# Patient Record
Sex: MALE | Race: WHITE | NOT HISPANIC OR LATINO | Employment: OTHER | URBAN - METROPOLITAN AREA
[De-identification: names, ages, dates, MRNs, and addresses within clinical notes are randomized per-mention and may not be internally consistent; named-entity substitution may affect disease eponyms.]

---

## 2017-02-02 ENCOUNTER — TRANSFERRED RECORDS (OUTPATIENT)
Dept: HEALTH INFORMATION MANAGEMENT | Facility: CLINIC | Age: 72
End: 2017-02-02

## 2017-02-08 ENCOUNTER — TRANSFERRED RECORDS (OUTPATIENT)
Dept: HEALTH INFORMATION MANAGEMENT | Facility: CLINIC | Age: 72
End: 2017-02-08

## 2017-02-08 ENCOUNTER — MEDICAL CORRESPONDENCE (OUTPATIENT)
Dept: HEALTH INFORMATION MANAGEMENT | Facility: CLINIC | Age: 72
End: 2017-02-08

## 2017-02-14 ENCOUNTER — TELEPHONE (OUTPATIENT)
Dept: UROLOGY | Facility: CLINIC | Age: 72
End: 2017-02-14

## 2017-02-14 ENCOUNTER — PRE VISIT (OUTPATIENT)
Dept: UROLOGY | Facility: CLINIC | Age: 72
End: 2017-02-14

## 2017-02-14 NOTE — TELEPHONE ENCOUNTER
----- Message from Wendie Giron sent at 2/14/2017  1:49 PM CST -----  Regarding: RE: Sooner w/ Dr. Mathur for Elevated PSA?  Contact: [phone]  Thank you! Patient is scheduled for that date and time.    Wendie  ----- Message -----     From: Sanna Peters RN     Sent: 2/14/2017  12:44 PM       To: Jennifer Guevara LPN, Wendie Giron  Subject: RE: Sooner w/ Dr. Mathur for Elevated PSA?       3/21 at 8:30 am   Sanna  ----- Message -----     From: Jennifer Guevara LPN     Sent: 2/14/2017  12:38 PM       To: Sanna Peters RN  Subject: FW: Sooner w/ Dr. Matuhr for Elevated PSA?       Can you help with this?  ----- Message -----     From: Wendie Giron     Sent: 2/14/2017  12:30 PM       To: Urology & [facility] Triage-  Subject: Sooner w/ Dr. Mathur for Elevated PSA?           Hey All!    Patient is being referred to Dr. Mathur for Elevated PSA from Dr. Rosa Elena Villar at [facility] in [place]. Next opening I see isn't until 4/5 w/ Dr. Mathur, but they are wanting him to be seen as soon as possible. Would Dr. Mathur add this patient on for a sooner appointment?    Thanks!  Wendie  [facility] Referrals  [phone]

## 2017-03-15 ENCOUNTER — PRE VISIT (OUTPATIENT)
Dept: UROLOGY | Facility: CLINIC | Age: 72
End: 2017-03-15

## 2017-03-15 NOTE — TELEPHONE ENCOUNTER
Patient with prostate cancer coming in for a consult. Chart reviewed and all records have been received and scanned. No need for a call.

## 2017-03-21 ENCOUNTER — OFFICE VISIT (OUTPATIENT)
Dept: UROLOGY | Facility: CLINIC | Age: 72
End: 2017-03-21

## 2017-03-21 VITALS
BODY MASS INDEX: 22.44 KG/M2 | WEIGHT: 143 LBS | DIASTOLIC BLOOD PRESSURE: 100 MMHG | HEIGHT: 67 IN | SYSTOLIC BLOOD PRESSURE: 153 MMHG | HEART RATE: 58 BPM

## 2017-03-21 DIAGNOSIS — R35.0 URINARY FREQUENCY: ICD-10-CM

## 2017-03-21 DIAGNOSIS — R97.20 ELEVATED PROSTATE SPECIFIC ANTIGEN (PSA): Primary | ICD-10-CM

## 2017-03-21 LAB
ALBUMIN UR-MCNC: NEGATIVE MG/DL
APPEARANCE UR: CLEAR
BACTERIA #/AREA URNS HPF: ABNORMAL /HPF
BILIRUB UR QL STRIP: NEGATIVE
COLOR UR AUTO: YELLOW
GLUCOSE UR STRIP-MCNC: NEGATIVE MG/DL
HGB UR QL STRIP: ABNORMAL
HYALINE CASTS #/AREA URNS LPF: 1 /LPF (ref 0–2)
KETONES UR STRIP-MCNC: NEGATIVE MG/DL
LEUKOCYTE ESTERASE UR QL STRIP: NEGATIVE
MUCOUS THREADS #/AREA URNS LPF: PRESENT /LPF
NITRATE UR QL: NEGATIVE
PH UR STRIP: 5 PH (ref 5–7)
RBC #/AREA URNS AUTO: 22 /HPF (ref 0–2)
SP GR UR STRIP: 1.02 (ref 1–1.03)
SQUAMOUS #/AREA URNS AUTO: <1 /HPF (ref 0–1)
URN SPEC COLLECT METH UR: ABNORMAL
UROBILINOGEN UR STRIP-MCNC: 0 MG/DL (ref 0–2)
WBC #/AREA URNS AUTO: <1 /HPF (ref 0–2)

## 2017-03-21 PROCEDURE — 87086 URINE CULTURE/COLONY COUNT: CPT | Performed by: UROLOGY

## 2017-03-21 RX ORDER — BACITRACIN 500 UNIT/G
480 OINTMENT (GRAM) TOPICAL EVERY EVENING
Status: ON HOLD | COMMUNITY
End: 2017-05-20

## 2017-03-21 RX ORDER — CIPROFLOXACIN 500 MG/1
500 TABLET, FILM COATED ORAL 2 TIMES DAILY
Qty: 6 TABLET | Refills: 0 | Status: SHIPPED | OUTPATIENT
Start: 2017-03-21 | End: 2017-04-05

## 2017-03-21 RX ORDER — MULTIVITAMIN
1 TABLET ORAL EVERY EVENING
COMMUNITY

## 2017-03-21 ASSESSMENT — ENCOUNTER SYMPTOMS
FLANK PAIN: 0
SHORTNESS OF BREATH: 0
DIZZINESS: 0
TROUBLE SWALLOWING: 0
ABDOMINAL PAIN: 0
POSTURAL DYSPNEA: 0
DISTURBANCES IN COORDINATION: 0
VOMITING: 0
FEVER: 0
HOARSE VOICE: 0
DYSURIA: 0
LEG SWELLING: 0
RECTAL BLEEDING: 0
STIFFNESS: 0
WHEEZING: 0
HEMATURIA: 0
NECK PAIN: 0
EXERCISE INTOLERANCE: 0
SKIN CHANGES: 0
NAIL CHANGES: 0
WEIGHT LOSS: 0
BLOOD IN STOOL: 0
NECK MASS: 0
SMELL DISTURBANCE: 0
LEG PAIN: 0
ORTHOPNEA: 0
RECTAL PAIN: 0
CHILLS: 0
COUGH DISTURBING SLEEP: 0
LOSS OF CONSCIOUSNESS: 0
COUGH: 0
POOR WOUND HEALING: 0
NIGHT SWEATS: 0
EYE IRRITATION: 0
SPEECH CHANGE: 0
INSOMNIA: 0
NAUSEA: 0
BACK PAIN: 0
WEIGHT GAIN: 0
SLEEP DISTURBANCES DUE TO BREATHING: 0
WEAKNESS: 0
BOWEL INCONTINENCE: 0
TACHYCARDIA: 0
HYPOTENSION: 0
SINUS PAIN: 0
SINUS CONGESTION: 0
TREMORS: 0
MUSCLE WEAKNESS: 0
JOINT SWELLING: 0
TASTE DISTURBANCE: 0
SPUTUM PRODUCTION: 0
CLAUDICATION: 0
POLYDIPSIA: 0
DIARRHEA: 0
MUSCLE CRAMPS: 0
POLYPHAGIA: 0
HEMOPTYSIS: 0
PARALYSIS: 0
EYE WATERING: 0
EXTREMITY NUMBNESS: 0
HEADACHES: 0
NUMBNESS: 0
HALLUCINATIONS: 0
DYSPNEA ON EXERTION: 0
SNORES LOUDLY: 0
LIGHT-HEADEDNESS: 0
SWOLLEN GLANDS: 0
JAUNDICE: 0
TINGLING: 0
FATIGUE: 0
EYE REDNESS: 0
SEIZURES: 0
HEARTBURN: 0
MYALGIAS: 0
ARTHRALGIAS: 0
ALTERED TEMPERATURE REGULATION: 0
INCREASED ENERGY: 0
SORE THROAT: 0
DIFFICULTY URINATING: 0
DEPRESSION: 0
DECREASED APPETITE: 0
RESPIRATORY PAIN: 0
HYPERTENSION: 0
BLOATING: 0
CONSTIPATION: 0
SYNCOPE: 0
DOUBLE VISION: 0
DECREASED CONCENTRATION: 0
EYE PAIN: 0
MEMORY LOSS: 0
BRUISES/BLEEDS EASILY: 0
NERVOUS/ANXIOUS: 0
PALPITATIONS: 0
PANIC: 0

## 2017-03-21 ASSESSMENT — PAIN SCALES - GENERAL: PAINLEVEL: NO PAIN (0)

## 2017-03-21 NOTE — MR AVS SNAPSHOT
After Visit Summary   3/21/2017    Eduardo Whitley    MRN: [id number]           Patient Information     Date Of Birth          1945        Visit Information        Provider Department      3/21/2017 8:30 AM Everton Mathur MD [facility] Urology and [facility] [[email or web address]] for Prostate and Urologic Cancers        Today's Diagnoses     Elevated prostate specific antigen (PSA)    -  1    Urinary frequency          Care Instructions      [place] for Prostate and Urologic Cancers  MRI Fusion Prostate Bx Patient Instructions  What is MRI Fusion Prostate Bx?  The MRI fusion biopsy is used to target a specific area for sampling. It requires a needle guide to be inserted into the rectum next to the prostate. Next, MR images are viewed, the abnormal area is identified, and a needle is inserted through the guide to the targeted area for tissue samples.  How do I prepare for the exam?    Take Cipro 500mg one tablet in the morning and one in the evening starting the day prior to procedure x 3 days    You will be receiving a Gentamicin intramuscular injection in the Urology clinic 30 min prior to your procedure. Please plan to arrive 30 min earlier.      Do Fleets Enema 2 hrs prior to procedure    Stop NSAIDS/Aspirin 7 days prior to procedure. If you are taking any other anticoagulation medications such as Coumadin, Heparin, or Lovenox, please consult with your physician prior to scheduling the procedure    How long will procedure take?  MRI fusion biopsy will take about 1 hr. Please allow 2 hrs for the whole procedure (including pre and post)  When will I know my results?  We will schedule a 2 week follow up appointment for you to review your pathology results with your physician.   *Please arrive 30 min prior to your scheduled procedure time*  Who do I contact if I have questions?  Please call Urology and [facility] at [phone] Opt # 3 to speak to a nurse.         Follow-ups after your visit        Your next 10 appointments  already scheduled     Mar 21, 2017 10:15 AM CDT   LAB with  LAB   [facility] ([facility])    [address]  [address] [facility] [id number] [phone]           Patient must bring picture ID.  Patient should be prepared to give a urine specimen  Please do not eat 10-12 hours before your appointment if you are coming in fasting for labs on lipids, cholesterol, or glucose (sugar).  Pregnant women should follow their Care Team instructions. Water with medications is okay. Do not drink coffee or other fluids.   If you have concerns about taking  your medications, please ask at office or if scheduling via [employer], send a message by clicking on Secure Messaging, Message Your Care Team.            Mar 22, 2017  9:30 AM CDT   (Arrive by 9:15 AM)   Sonography/Biopsy with Everton Mathur MD   [facility] Urology and [facility] for Prostate and Urologic Cancers ([facility])    [address]  [address] [place] [facility] [id number] [phone]            Apr 05, 2017 11:00 AM CDT   (Arrive by 10:45 AM)   Return Visit with Everton Mathur MD   [facility] Urology and [facility] for Prostate and Urologic Cancers ([facility])    [address] [place] [address]  [address] [facility] [address] [phone]              Future tests that were ordered for you today     Open Future Orders        Priority Expected Expires Ordered    Urine Culture Aerobic Bacterial Routine 3/21/2017 3/21/2018 3/21/2017    Routine UA with microscopic - No culture Routine  3/21/2018 3/21/2017            Who to contact     Please call your clinic at [phone] to:    Ask questions about your health    Make or cancel appointments    Discuss your medicines    Learn about your test results    Speak to your doctor   If you have compliments or concerns about an experience at your clinic, or if you wish to file a complaint, please contact [facility]  "Patient Relations at [phone] or email us at [email or web address]         Additional Information About Your Visit        S[employer] Information     [email or web address] gives you secure access to your electronic health record. If you see a primary care provider, you can also send messages to your care team and make appointments. If you have questions, please call your primary care clinic.  If you do not have a primary care provider, please call [phone] and they will assist you.      [email or web address] is an electronic gateway that provides easy, online access to your medical records. With [email or web address], you can request a clinic appointment, read your test results, renew a prescription or communicate with your care team.     To access your existing account, please contact your [facility] or call [phone] for assistance.        Care EveryWhere ID     This is your Care EveryWhere ID. This could be used by other organizations to access your [facility] medical records  [id number]        Your Vitals Were     Pulse Height BMI (Body Mass Index)             58 1.689 m (5' 6.5\") 22.74 kg/m2          Blood Pressure from Last 3 Encounters:   03/21/17 (!) 153/100    Weight from Last 3 Encounters:   03/21/17 64.9 kg (143 lb)                 Today's Medication Changes          These changes are accurate as of: 3/21/17  9:42 AM.  If you have any questions, ask your nurse or doctor.               Start taking these medicines.        Dose/Directions    ciprofloxacin 500 MG tablet   Commonly known as:  CIPRO   Used for:  Elevated prostate specific antigen (PSA)   Started by:  Everton Mathur MD        Dose:  500 mg   Take 1 tablet (500 mg) by mouth 2 times daily   Quantity:  6 tablet   Refills:  0            Where to get your medicines      These medications were sent to [facility]/pharmacy #[id number] [place], [place] - [address] [place] [id number]     Phone:  [phone]     " ciprofloxacin 500 MG tablet                Primary Care Provider    None Specified       No primary provider on file.        Thank you!     Thank you for choosing [facility] UROLOGY AND [facility] FOR PROSTATE AND UROLOGIC CANCERS  for your care. Our goal is always to provide you with excellent care. Hearing back from our patients is one way we can continue to improve our services. Please take a few minutes to complete the written survey that you may receive in the mail after your visit with us. Thank you!             Your Updated Medication List - Protect others around you: Learn how to safely use, store and throw away your medicines at www.disposemymeds.org.          This list is accurate as of: 3/21/17  9:42 AM.  Always use your most recent med list.                   Brand Name Dispense Instructions for use    ciprofloxacin 500 MG tablet    CIPRO    6 tablet    Take 1 tablet (500 mg) by mouth 2 times daily       Multiple vitamin Tabs      Take 1 tablet by mouth       Saw Palmetto 160 MG Caps      Take 480 mg by mouth

## 2017-03-21 NOTE — LETTER
3/21/2017       RE: Eduardo Whitley  [address] [facility] [address]     Dear Colleague,    Thank you for referring your patient, Eduardo Whitley, to the [facility] UROLOGY AND INST FOR PROSTATE AND UROLOGIC CANCERS at [facility]. Please see a copy of my visit note below.    We are pleased to see Mr. Eduardo Whitley in consultation at the request of Zeenat Villar for the evaluation of chief complaint listed below    Chief Complaint:    Elevated PSA         History of Present Illness:   Eduardo Whitley is a(n) 71 year old male w/ an unremarkable past medical history who presents with a gradually increasing PSA. He was noted to have a PSA of 4.3 at an annual physical 10 years ago. He started taking saw palmetto and eating lots of tomatoes at that time. PSA was down to 2.8 a year later. 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 Cystoscopy at that time revealed normal bladder mucosa with moderate prostate obstruction. Subsequent MRI on 2/2/16 demonstrated a focal curvilinear region of mild-moderate T2 hypointensity in the left peripheral zone of his prostate, of non-specific etiology. PI- RADS score was 3. Mild cystic change was also noted throughout the transitional one and there was no evidence of lymphadenopathy. He was referred to us for further evaluation and possible fusion biopsy. He denies any unexplained weight loss, night sweats, back pain, pelvic pain, urinary symptoms, or sexual/erectile dysfunction. He has no further physical concerns at this time.           Past Medical History:     Past Medical History   Diagnosis Date     Rotator cuff tear             Past  Surgical History:     Past Surgical History   Procedure Laterality Date     Tonsillectomy       Adenoidectomy       Right shoulder arthroscopy       Left shoulder arthroscopy              Social History:   Used to serve in the .       Smoking: Never  Alcohol: Yes  IV Drug Use: None         Family History:   No family history on file.  No urologic cancers in the family.         Allergies:   No Known Allergies         Medications:     Current Outpatient Prescriptions   Medication Sig     Multiple vitamin TABS Take 1 tablet by mouth     Saw Palmetto 160 MG CAPS Take 480 mg by mouth     No current facility-administered medications for this visit.             REVIEW OF SYSTEMS:   Review of Systems     Constitutional:  Negative for fever, chills, weight loss, weight gain, fatigue, decreased appetite, night sweats, recent stressors, height gain, height loss, post-operative complications, incisional pain, hallucinations, increased energy, hyperactivity and confused.   HENT:  Negative for ear pain, hearing loss, tinnitus, nosebleeds, trouble swallowing, hoarse voice, mouth sores, sore throat, ear discharge, tooth pain, gum tenderness, taste disturbance, smell disturbance, hearing aid, bleeding gums, dry mouth, sinus pain, sinus congestion and neck mass.    Eyes:  Negative for double vision, pain, redness, eye pain, decreased vision, eye watering, eye bulging, eye dryness, flashing lights, spots, floaters, strabismus, tunnel vision, jaundice and eye irritation.   Respiratory:   Negative for cough, hemoptysis, sputum production, shortness of breath, wheezing, sleep disturbances due to breathing, snores loudly, respiratory pain, dyspnea on exertion, cough disturbing sleep and postural dyspnea.    Cardiovascular:  Negative for chest pain, dyspnea on exertion, palpitations, orthopnea, claudication, leg swelling, fingers/toes turn blue, hypertension, hypotension, syncope, history of heart murmur, chest pain on exertion,  "chest pain at rest, pacemaker, few scattered varicosities, leg pain, sleep disturbances due to breathing, tachycardia, light-headedness, exercise intolerance and edema.   Gastrointestinal:  Negative for heartburn, nausea, vomiting, abdominal pain, diarrhea, constipation, blood in stool, melena, rectal pain, bloating, hemorrhoids, bowel incontinence, jaundice, rectal bleeding, coffee ground emesis and change in stool.   Genitourinary:  Negative for bladder incontinence, dysuria, urgency, hematuria, flank pain, difficulty urinating, nocturia, voiding less frequently, scrotal pain, ulcerations, penile discharge, male genitourinary complaint and reduced libido.   Musculoskeletal:  Negative for myalgias, back pain, joint swelling, arthralgias, stiffness, muscle cramps, neck pain, bone pain, muscle weakness and fracture.   Skin:  Negative for nail changes, itching, poor wound healing, rash, hair changes, skin changes, acne, warts, poor wound healing, scarring, flaky skin, Raynaud's phenomenon, sensitivity to sunlight and skin thickening.   Neurological:  Negative for dizziness, tingling, tremors, speech change, seizures, loss of consciousness, weakness, light-headedness, numbness, headaches, disturbances in coordination, extremity numbness, memory loss, difficulty walking and paralysis.   Endo/Heme:  Negative for anemia, swollen glands and bruises/bleeds easily.   Psychiatric/Behavioral:  Negative for depression, hallucinations, memory loss, decreased concentration, mood swings and panic attacks.    Endocrine:  Negative for altered temperature regulation, polyphagia, polydipsia, unwanted hair growth and change in facial hair.             PHYSICAL EXAM   BP (!) 153/100  Pulse 58  Ht 1.689 m (5' 6.5\")  Wt 64.9 kg (143 lb)  BMI 22.74 kg/m2  GENERAL: No acute distress. Well nourished.   HEENT:  Sclerae anicteric.  Conjunctivae pink.  Moist mucous membranes.  NECK:  Supple.  No lymphadenopathy.  CARDIAC:  Regular rate and " rhythm.  LUNGS:  Non-labored breathing  BACK:  No costovertebral tenderness.  ABDOMEN: Soft, non-tender, no surgical scars, no organomegaly, non-tender.  :  Phallus circumcised, meatus adequate, no plaques palpated. Testes descended bilaterally, no intratesticular masses.  Epididymes non-tender.  No varicoceles or inguinal hernias.  RECTAL:  Good tone.  Prostate smooth, symmetric, non-tender, no nodules. Small size.  SKIN: No rashes.  Dry.     EXTREMITIES:  Warm, well perfused.  No lower extremity edema bilaterally.  NEURO: normal gait, no focal deficits.           LABS AND IMAGING:   No new imaging to review. Outside imaging reviewed and was remarkable for the following:    CT Urogram 10/18/16:  Bilateral cysts, no solid renal masses, large lipomatous mass in L pelvis    MRI Prostate 2/217:  Focal curvilinear region of mild-moderate T2 hypointensity in L peripheral zone, non-specific etiology. PI-RADS: 3. Mild cystic change throughout transitional zone of prostate. No evidence of lymphadenopathy.    PSA values as per HPI          ASSESSMENT:   Eduardo Whitley is a healthy 71 year old male who presents for elevated PSA. JOSH today is unremarkable and not concerning. Benefit of fusion biopsy for further evaluation of prostate lesion noted on outside MRI was discussed. He voiced understanding of the risks and benefits of this procedure and wishes to proceed.   Unfortunately, we will not be able to perform this until tomorrow.           PLAN:     Urinalysis    MRI fusion biopsy tomorrow morning    I, Javier Villa, MS3, am acting as scribe for Dr. Everton Mathur MD.    Patient was seen and examined with Dr. Mathur      CC: Garrels  Patient seen and examined by me.  I agree with the medical student's documentation of the encounter.   I obtained the history and examined the patient and student only acted as a scribe.  Total visit time 30 minutes with >50% spent in counseling  Everton Mathur MD

## 2017-03-21 NOTE — NURSING NOTE
"Chief Complaint   Patient presents with     Consult     Elevated PSA       Blood pressure (!) 153/100, pulse 58, height 1.689 m (5' 6.5\"), weight 64.9 kg (143 lb). Body mass index is 22.74 kg/(m^2).    There is no problem list on file for this patient.      No Known Allergies    Current Outpatient Prescriptions   Medication Sig Dispense Refill     Multiple vitamin TABS Take 1 tablet by mouth       Saw Palmetto 160 MG CAPS Take 480 mg by mouth         Social History   Substance Use Topics     Smoking status: Never Smoker     Smokeless tobacco: Never Used     Alcohol use Not on file       TIFFANY Perez  3/21/2017  8:35 AM       "

## 2017-03-21 NOTE — PATIENT INSTRUCTIONS
[place] for Prostate and Urologic Cancers  MRI Fusion Prostate Bx Patient Instructions  What is MRI Fusion Prostate Bx?  The MRI fusion biopsy is used to target a specific area for sampling. It requires a needle guide to be inserted into the rectum next to the prostate. Next, MR images are viewed, the abnormal area is identified, and a needle is inserted through the guide to the targeted area for tissue samples.  How do I prepare for the exam?    Take Cipro 500mg one tablet in the morning and one in the evening starting the day prior to procedure x 3 days    You will be receiving a Gentamicin intramuscular injection in the Urology clinic 30 min prior to your procedure. Please plan to arrive 30 min earlier.      Do Fleets Enema 2 hrs prior to procedure    Stop NSAIDS/Aspirin 7 days prior to procedure. If you are taking any other anticoagulation medications such as Coumadin, Heparin, or Lovenox, please consult with your physician prior to scheduling the procedure    How long will procedure take?  MRI fusion biopsy will take about 1 hr. Please allow 2 hrs for the whole procedure (including pre and post)  When will I know my results?  We will schedule a 2 week follow up appointment for you to review your pathology results with your physician.   *Please arrive 30 min prior to your scheduled procedure time*  Who do I contact if I have questions?  Please call Urology and [facility] at [phone] Opt # [phone] to speak to a nurse.

## 2017-03-21 NOTE — PROGRESS NOTES
We are pleased to see Mr. Eduardo Whitley in consultation at the request of Zeenat Villar for the evaluation of chief complaint listed below    Chief Complaint:    Elevated PSA         History of Present Illness:   Eduardo Whitley is a(n) 71 year old male w/ an unremarkable past medical history who presents with a gradually increasing PSA. He was noted to have a PSA of 4.3 at an annual physical 10 years ago. He started taking saw palmetto and eating lots of tomatoes at that time. PSA was down to 2.8 a year later. 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 Cystoscopy at that time revealed normal bladder mucosa with moderate prostate obstruction. Subsequent MRI on 2/2/16 demonstrated a focal curvilinear region of mild-moderate T2 hypointensity in the left peripheral zone of his prostate, of non-specific etiology. PI- RADS score was 3. Mild cystic change was also noted throughout the transitional one and there was no evidence of lymphadenopathy. He was referred to us for further evaluation and possible fusion biopsy. He denies any unexplained weight loss, night sweats, back pain, pelvic pain, urinary symptoms, or sexual/erectile dysfunction. He has no further physical concerns at this time.           Past Medical History:     Past Medical History   Diagnosis Date     Rotator cuff tear             Past Surgical History:     Past Surgical History   Procedure Laterality Date     Tonsillectomy       Adenoidectomy       Right shoulder arthroscopy       Left shoulder arthroscopy              Social History:   Used to serve in the .       Smoking: Never  Alcohol: Yes  IV Drug Use: None         Family  History:   No family history on file.  No urologic cancers in the family.         Allergies:   No Known Allergies         Medications:     Current Outpatient Prescriptions   Medication Sig     Multiple vitamin TABS Take 1 tablet by mouth     Saw Palmetto 160 MG CAPS Take 480 mg by mouth     No current facility-administered medications for this visit.             REVIEW OF SYSTEMS:   Review of Systems     Constitutional:  Negative for fever, chills, weight loss, weight gain, fatigue, decreased appetite, night sweats, recent stressors, height gain, height loss, post-operative complications, incisional pain, hallucinations, increased energy, hyperactivity and confused.   HENT:  Negative for ear pain, hearing loss, tinnitus, nosebleeds, trouble swallowing, hoarse voice, mouth sores, sore throat, ear discharge, tooth pain, gum tenderness, taste disturbance, smell disturbance, hearing aid, bleeding gums, dry mouth, sinus pain, sinus congestion and neck mass.    Eyes:  Negative for double vision, pain, redness, eye pain, decreased vision, eye watering, eye bulging, eye dryness, flashing lights, spots, floaters, strabismus, tunnel vision, jaundice and eye irritation.   Respiratory:   Negative for cough, hemoptysis, sputum production, shortness of breath, wheezing, sleep disturbances due to breathing, snores loudly, respiratory pain, dyspnea on exertion, cough disturbing sleep and postural dyspnea.    Cardiovascular:  Negative for chest pain, dyspnea on exertion, palpitations, orthopnea, claudication, leg swelling, fingers/toes turn blue, hypertension, hypotension, syncope, history of heart murmur, chest pain on exertion, chest pain at rest, pacemaker, few scattered varicosities, leg pain, sleep disturbances due to breathing, tachycardia, light-headedness, exercise intolerance and edema.   Gastrointestinal:  Negative for heartburn, nausea, vomiting, abdominal pain, diarrhea, constipation, blood in stool, melena, rectal pain,  "bloating, hemorrhoids, bowel incontinence, jaundice, rectal bleeding, coffee ground emesis and change in stool.   Genitourinary:  Negative for bladder incontinence, dysuria, urgency, hematuria, flank pain, difficulty urinating, nocturia, voiding less frequently, scrotal pain, ulcerations, penile discharge, male genitourinary complaint and reduced libido.   Musculoskeletal:  Negative for myalgias, back pain, joint swelling, arthralgias, stiffness, muscle cramps, neck pain, bone pain, muscle weakness and fracture.   Skin:  Negative for nail changes, itching, poor wound healing, rash, hair changes, skin changes, acne, warts, poor wound healing, scarring, flaky skin, Raynaud's phenomenon, sensitivity to sunlight and skin thickening.   Neurological:  Negative for dizziness, tingling, tremors, speech change, seizures, loss of consciousness, weakness, light-headedness, numbness, headaches, disturbances in coordination, extremity numbness, memory loss, difficulty walking and paralysis.   Endo/Heme:  Negative for anemia, swollen glands and bruises/bleeds easily.   Psychiatric/Behavioral:  Negative for depression, hallucinations, memory loss, decreased concentration, mood swings and panic attacks.    Endocrine:  Negative for altered temperature regulation, polyphagia, polydipsia, unwanted hair growth and change in facial hair.             PHYSICAL EXAM   BP (!) 153/100  Pulse 58  Ht 1.689 m (5' 6.5\")  Wt 64.9 kg (143 lb)  BMI 22.74 kg/m2  GENERAL: No acute distress. Well nourished.   HEENT:  Sclerae anicteric.  Conjunctivae pink.  Moist mucous membranes.  NECK:  Supple.  No lymphadenopathy.  CARDIAC:  Regular rate and rhythm.  LUNGS:  Non-labored breathing  BACK:  No costovertebral tenderness.  ABDOMEN: Soft, non-tender, no surgical scars, no organomegaly, non-tender.  :  Phallus circumcised, meatus adequate, no plaques palpated. Testes descended bilaterally, no intratesticular masses.  Epididymes non-tender.  No " varicoceles or inguinal hernias.  RECTAL:  Good tone.  Prostate smooth, symmetric, non-tender, no nodules. Small size.  SKIN: No rashes.  Dry.     EXTREMITIES:  Warm, well perfused.  No lower extremity edema bilaterally.  NEURO: normal gait, no focal deficits.           LABS AND IMAGING:   No new imaging to review. Outside imaging reviewed and was remarkable for the following:    CT Urogram 10/18/16:  Bilateral cysts, no solid renal masses, large lipomatous mass in L pelvis    MRI Prostate 2/217:  Focal curvilinear region of mild-moderate T2 hypointensity in L peripheral zone, non-specific etiology. PI-RADS: 3. Mild cystic change throughout transitional zone of prostate. No evidence of lymphadenopathy.    PSA values as per HPI          ASSESSMENT:   Eduardo Whitley is a healthy 71 year old male who presents for elevated PSA. JOSH today is unremarkable and not concerning. Benefit of fusion biopsy for further evaluation of prostate lesion noted on outside MRI was discussed. He voiced understanding of the risks and benefits of this procedure and wishes to proceed.   Unfortunately, we will not be able to perform this until tomorrow.           PLAN:     Urinalysis    MRI fusion biopsy tomorrow morning    I, Javier Villa, MS3, am acting as scribe for Dr. Everton Mathur MD.    Patient was seen and examined with Dr. Mathur      CC: [employer]  Patient seen and examined by me.  I agree with the medical student's documentation of the encounter.   I obtained the history and examined the patient and student only acted as a scribe.  Total visit time 30 minutes with >50% spent in counseling  Everton Mathur MD

## 2017-03-22 ENCOUNTER — OFFICE VISIT (OUTPATIENT)
Dept: UROLOGY | Facility: CLINIC | Age: 72
End: 2017-03-22

## 2017-03-22 ENCOUNTER — HOSPITAL ENCOUNTER (INPATIENT)
Dept: GENERAL RADIOLOGY | Facility: CLINIC | Age: 72
End: 2017-03-22
Attending: UROLOGY

## 2017-03-22 VITALS
BODY MASS INDEX: 22.98 KG/M2 | WEIGHT: 143 LBS | HEART RATE: 60 BPM | HEIGHT: 66 IN | SYSTOLIC BLOOD PRESSURE: 174 MMHG | DIASTOLIC BLOOD PRESSURE: 97 MMHG

## 2017-03-22 DIAGNOSIS — R97.20 ELEVATED PROSTATE SPECIFIC ANTIGEN (PSA): Primary | ICD-10-CM

## 2017-03-22 LAB
BACTERIA SPEC CULT: NO GROWTH
Lab: NORMAL
MICRO REPORT STATUS: NORMAL
SPECIMEN SOURCE: NORMAL

## 2017-03-22 PROCEDURE — 88305 TISSUE EXAM BY PATHOLOGIST: CPT | Performed by: UROLOGY

## 2017-03-22 ASSESSMENT — PAIN SCALES - GENERAL
PAINLEVEL: NO PAIN (0)
PAINLEVEL: NO PAIN (0)

## 2017-03-22 NOTE — NURSING NOTE
"Chief Complaint   Patient presents with     Prostate Biopsy     Elevated PSA, abnormal MRI     Verified with patient that he has not taken blood thinners or aspirin products in seven days, started Cipro 500 mg BID x 3 days yesterday 3/21/17, and did a fleets enema earlier today.    Blood pressure (!) 183/117, pulse 60, height 1.689 m (5' 6.5\"), weight 64.9 kg (143 lb). Body mass index is 22.74 kg/(m^2).    There is no problem list on file for this patient.      No Known Allergies    Current Outpatient Prescriptions   Medication Sig Dispense Refill     Multiple vitamin TABS Take 1 tablet by mouth       Saw Palmetto 160 MG CAPS Take 480 mg by mouth       ciprofloxacin (CIPRO) 500 MG tablet Take 1 tablet (500 mg) by mouth 2 times daily 6 tablet 0       Social History   Substance Use Topics     Smoking status: Never Smoker     Smokeless tobacco: Never Used     Alcohol use Not on file       TIFFANY Perez  3/22/2017  10:15 AM       "

## 2017-03-22 NOTE — NURSING NOTE
The following medication was given:     MEDICATION: Gentamicin  ROUTE: IM  SITE: [facility]  DOSE: 80 mg   LOT #: [id number]  :  Xuanyixia  EXPIRATION DATE:  04/18  NDC#: 64787-997-36

## 2017-03-22 NOTE — NURSING NOTE
Lidocaine 1%  Drug Amount given = 10 mL  Drug Amount wasted = 10 mL  Laina Galloway CMA  March 22, 2017

## 2017-03-22 NOTE — LETTER
3/22/2017       RE: Eduardo Whitley  [address] [facility] [address]     Dear Colleague,    Thank you for referring your patient, Eduardo Whitley, to the [facility] UROLOGY AND INST FOR PROSTATE AND UROLOGIC CANCERS at [facility]. Please see a copy of my visit note below.    PREPROCEDURE DIAGNOSIS: elevated PSA.   POSTPROCEDURE DIAGNOSIS: same.   PROCEDURE: MRI fusion guided transrectal ultrasound guided prostatic needle biopsy   SURGEON: Kevan  ANESTHESIA: 5 mL of 1% periprostatic block bilaterally   DESCRIPTION OF PROCEDURE: The procedure, the outcome, the anesthesia, and the risks were discussed with the patient.  Informed consent was obtained and signed and a timeout was completed prior to the procedure. Patient was placed in a left lateral decubitus position on the procedure table. Onetargets identified on MRI left mid PZ.   A 7.5mHz ultrasound probe was inserted into the rectum.  TRUS demonstrated a 22.4cc prostate. 1% lidocaine anesthesia was injected into the periprostatic tissue on either side at base and apex for a total of 10cc.  Sweep performed and ultrasound images aligned with MR images.  Biopsies were taken from the one targets (3 cores) and from the left and right base, mid and apex medial and lateral for a total of 15cores.  Patient tolerated the procedure well.  There were no complications.    PLAN: We will call him with the results as soon as they are made available. Patient will  follow-up in 2 weeks.       Again, thank you for allowing me to participate in the care of your patient.      Sincerely,    Everton Mathur MD

## 2017-03-22 NOTE — PROGRESS NOTES
PREPROCEDURE DIAGNOSIS: elevated PSA.   POSTPROCEDURE DIAGNOSIS: same.   PROCEDURE: MRI fusion guided transrectal ultrasound guided prostatic needle biopsy   SURGEON: Kevan  ANESTHESIA: 5 mL of 1% periprostatic block bilaterally   DESCRIPTION OF PROCEDURE: The procedure, the outcome, the anesthesia, and the risks were discussed with the patient.  Informed consent was obtained and signed and a timeout was completed prior to the procedure. Patient was placed in a left lateral decubitus position on the procedure table. Onetargets identified on MRI left mid PZ.   A 7.5mHz ultrasound probe was inserted into the rectum.  TRUS demonstrated a 22.4cc prostate. 1% lidocaine anesthesia was injected into the periprostatic tissue on either side at base and apex for a total of 10cc.  Sweep performed and ultrasound images aligned with MR images.  Biopsies were taken from the one targets (3 cores) and from the left and right base, mid and apex medial and lateral for a total of 15cores.  Patient tolerated the procedure well.  There were no complications.    PLAN: We will call him with the results as soon as they are made available. Patient will  follow-up in 2 weeks.

## 2017-03-22 NOTE — MR AVS SNAPSHOT
After Visit Summary   3/22/2017    Eduardo Whitley    MRN: [id number]           Patient Information     Date Of Birth          1945        Visit Information        Provider Department      3/22/2017 9:30 AM Everton Mathur MD [facility] Urology and [facility] for Prostate and Urologic Cancers        Today's Diagnoses     Elevated prostate specific antigen (PSA)    -  1      Care Instructions        [place] for Prostate and Urologic Cancers  Precautions Following a Prostate Biopsy    There are four conditions that you should watch for after a prostate biopsy:    1. Excessive pain  2. Bleeding irregularities (passing numerous  dime sized  clots or if your urine looks like cranberry juice)  3. Fever of 100 degrees or more  4. If you are unable to urinate        If any of these occur, call the Urology Clinic during normal business hours (M-F, 8:00-4:30) at [phone].  If you experience a problem after normal business hours, call our 24-hour phone number at [phone] and ask for the Urology Resident on call to be paged.      If you experience any discomfort following the biopsy, you may take Tylenol.  DO NOT TAKE ASPIRIN unless specified by your physician.   If the discomfort becomes severe or uncontrolled by medication, contact the Urology Clinic or Urology Resident (after normal business hours).      Do not be alarmed if you have some blood in your stool, in your urine, or ejaculate (semen).  This occurrence is normal and may last up to three (3) or four (4) days, usually intermittently.  Blood in the ejaculate (semen) may last several weeks, up to about a dozen ejaculations.  The blood in your ejaculate may appear as brown streaks, blood tinged, and immediately following a biopsy, it may appear bright red.      If you run a fever above 100 degrees, call the Urology Clinic or Urology Resident (after normal business hours) immediately.  If you are unable to reach your physician or the Resident on  call, go to the nearest emergency room.  Explain that you have had a transrectal biopsy of your prostate and what problems you are experiencing.        You should attempt to urinate following your biopsy before you leave the clinic.  If you are unable to urinate four (4) to six (6) hours after you leave the clinic, you will need to contact the Urology Clinic or the Resident on call.  If you are unable to reach your physician or the Resident on call, go to the nearest emergency room.            If you have any questions or concerns after your biopsy, feel free to contact the Urology Clinic at [phone] during M-F, 8:00-5pm business hours.  If you need to speak with someone after normal business hours, call [phone] and ask for the Resident on call to be paged.        Follow-ups after your visit        Follow-up notes from your care team     Return in about 2 weeks (around 4/5/2017).      Your next 10 appointments already scheduled     Apr 05, 2017 11:00 AM CDT   (Arrive by 10:45 AM)   Return Visit with Everton Mathur MD   [facility] Urology and [facility] for Prostate and Urologic Cancers ([facility])    [address] [place] [id number] [phone]              Future tests that were ordered for you today     Open Future Orders        Priority Expected Expires Ordered    MR Outside Read Routine  3/21/2018 3/21/2017            Who to contact     Please call your clinic at [phone] to:    Ask questions about your health    Make or cancel appointments    Discuss your medicines    Learn about your test results    Speak to your doctor   If you have compliments or concerns about an experience at your clinic, or if you wish to file a complaint, please contact [facility] Patient Relations at [phone] or email us at [email or web address]         Additional Information About Your Visit        MyChart Information     MyChart  "gives you secure access to your electronic health record. If you see a primary care provider, you can also send messages to your care team and make appointments. If you have questions, please call your primary care clinic.  If you do not have a primary care provider, please call [phone] and they will assist you.      NextNine is an electronic gateway that provides easy, online access to your medical records. With NextNine, you can request a clinic appointment, read your test results, renew a prescription or communicate with your care team.     To access your existing account, please contact your [facility] or call [phone] for assistance.        Care EveryWhere ID     This is your Care EveryWhere ID. This could be used by other organizations to access your [facility] medical records  [id number]        Your Vitals Were     Pulse Height BMI (Body Mass Index)             60 1.689 m (5' 6.5\") 22.74 kg/m2          Blood Pressure from Last 3 Encounters:   03/22/17 (!) 174/97   03/21/17 (!) 153/100    Weight from Last 3 Encounters:   03/22/17 64.9 kg (143 lb)   03/21/17 64.9 kg (143 lb)              We Performed the Following     BIOPSY PROSTATE NEEDLE/PUNCH     MRI Fusion (In Clinic)     US GUIDE FOR NEEDLE PLACEMENT     US TRANSRECTAL        Primary Care Provider    None Specified       No primary provider on file.        Thank you!     Thank you for choosing [facility] UROLOGY AND [facility] FOR PROSTATE AND UROLOGIC CANCERS  for your care. Our goal is always to provide you with excellent care. Hearing back from our patients is one way we can continue to improve our services. Please take a few minutes to complete the written survey that you may receive in the mail after your visit with us. Thank you!             Your Updated Medication List - Protect others around you: Learn how to safely use, store and throw away your medicines at www.disposemymeds.org.          This list is accurate as " of: 3/22/17 11:19 AM.  Always use your most recent med list.                   Brand Name Dispense Instructions for use    ciprofloxacin 500 MG tablet    CIPRO    6 tablet    Take 1 tablet (500 mg) by mouth 2 times daily       Multiple vitamin Tabs      Take 1 tablet by mouth       Saw Palmetto 160 MG Caps      Take 480 mg by mouth

## 2017-03-22 NOTE — PATIENT INSTRUCTIONS
[place] for Prostate and Urologic Cancers  Precautions Following a Prostate Biopsy    There are four conditions that you should watch for after a prostate biopsy:    1. Excessive pain  2. Bleeding irregularities (passing numerous  dime sized  clots or if your urine looks like cranberry juice)  3. Fever of 100 degrees or more  4. If you are unable to urinate        If any of these occur, call the Urology Clinic during normal business hours (M-F, 8:00-4:30) at [phone].  If you experience a problem after normal business hours, call our 24-hour phone number at [phone] and ask for the Urology Resident on call to be paged.      If you experience any discomfort following the biopsy, you may take Tylenol.  DO NOT TAKE ASPIRIN unless specified by your physician.   If the discomfort becomes severe or uncontrolled by medication, contact the Urology Clinic or Urology Resident (after normal business hours).      Do not be alarmed if you have some blood in your stool, in your urine, or ejaculate (semen).  This occurrence is normal and may last up to three (3) or four (4) days, usually intermittently.  Blood in the ejaculate (semen) may last several weeks, up to about a dozen ejaculations.  The blood in your ejaculate may appear as brown streaks, blood tinged, and immediately following a biopsy, it may appear bright red.      If you run a fever above 100 degrees, call the Urology Clinic or Urology Resident (after normal business hours) immediately.  If you are unable to reach your physician or the Resident on call, go to the nearest emergency room.  Explain that you have had a transrectal biopsy of your prostate and what problems you are experiencing.        You should attempt to urinate following your biopsy before you leave the clinic.  If you are unable to urinate four (4) to six (6) hours after you leave the clinic, you will need to contact the Urology Clinic or the Resident on call.  If you are unable to reach  your physician or the Resident on call, go to the nearest emergency room.            If you have any questions or concerns after your biopsy, feel free to contact the Urology Clinic at [phone] during M-F, 8:00-5pm business hours.  If you need to speak with someone after normal business hours, call [phone] and ask for the Resident on call to be paged.

## 2017-03-22 NOTE — NURSING NOTE
Invasive Procedure Safety Checklist:    Procedure:     Action: Complete sections and checkboxes as appropriate.    Pre-procedure:  1. Patient ID Verified with 2 identifiers (Zulema and  or MRN) : YES    2. Procedure and site verified with patient/designee (when able) : YES    3. Accurate consent documentation in medical record : YES    4. H&P (or appropriate assessment) documented in medical record : YES  H&P must be up to 30 days prior to procedure an updated within 24 hours of                 Procedure as applicable.     5. Relevant diagnostic and radiology test results appropriately labeled and displayed as applicable : YES    6. Blood products, implants, devices, and/or special equipment available for the procedure as applicable : YES    7. Procedure site(s) marked with provider initials [Exclusions:   None] : NO    8. Marking not required. Reason : Yes  Procedure does not require site marking    Time Out:     Time-Out performed immediately prior to starting procedure, including verbal and active participation of all team members addressing: YES    1. Correct patient identity.  2. Confirmed that the correct side and site are marked.  3. An accurate procedure to be done.  4. Agreement on the procedure to be done.  5. Correct patient position.  6. Relevant images and results are properly labeled and appropriately displayed.  7. The need to administer antibiotics or fluids for irrigation purposes during the procedure as applicable.  8. Safety precautions based on patient history or medication use.    During Procedure: Verification of correct person, site, and procedure occurs any time the responsibility for care of the patient is transferred to another member of the care team.

## 2017-03-22 NOTE — LETTER
April 4, 2017       TO: Eduardo Whitley  [address] [facility] [address]       DearMr.Angi,    We are writing to inform you of your test results.    Test results indicate you may require additional follow up, see comment below.    Resulted Orders   Surgical pathology exam   Result Value Ref Range    Copath Report       Patient Name: EDUARDO WHITLEY  MR#: [id number]  Specimen #: [id number]  Collected: 3/22/2017  Received: 3/22/2017  Reported: 3/23/2017 19:37  Ordering Phy(s): SUZI KIM    For improved result formatting, select 'View Enhanced Report Format'  under Linked Documents section.    SPECIMEN(S):  A: Prostate biopsy, left mid PZ  B: Prostate biopsy, left base  C: Prostate biopsy, left mid  D: Prostate biopsy, left apex  E: Prostate biopsy, right base  F: Prostate biopsy, right mid  G: Prostate biopsy, right apex    FINAL DIAGNOSIS:  A. Prostate, left mid PZ, biopsy:  - Prostatic adenocarcinoma, acinar type  - Grade group 4  - [place] score 8 (4+4)  - Extent: Involves one core (0.5 mm; <5%)    B. Prostate, left base, biopsy:  - Prostatic adenocarcinoma, acinar type  - Grade group 3  - [place] score 7 (4+3)  - Extent: Involves two cores (10 mm; 90%; 10 mm, 85%)    C. Prostate, left mid, biopsy:  - Prostatic adenocarcinoma, acinar type  - Grade group 3  - [place] score 7 (4+3)  - Extent:  Involves two cores (9.5 mm; 90%; 8 mm, 70%)    D. Prostate, left apex, biopsy:  - Prostatic adenocarcinoma, acinar type  - Grade group 3  - Nabeel score 7 (4+3)  - Extent: Involves two cores (10 mm; 90%; 5 mm, 50%)    E. Prostate, right base, biopsy:  - High-grade prostatic intraepithelial neoplasm (HGPIN)    F. Prostate, right mid, biopsy:  - Benign prostatic tissue    G. Prostate, right apex, biopsy:  - High-grade prostatic intraepithelial neoplasm (HGPIN)    I have personally reviewed all specimens and or slides, including the  listed special stains, and used them with my medical judgement to  determine the  "final diagnosis.    Electronically signed out by:    Praveen Mcknight M.D., [facility]    CLINICAL HISTORY:  71 year-old male with a PSA of 8.48 on 3/15/17.  A MRI revealed a  curvilinear marginated focal region at the mid to posterior left lateral  aspect of the mid to inferior peripheral zone measuring 1.5 x 1.0 cm.    GROSS:  A: The specimen is received in formalin with prope r patient  identification, labeled \"prostate, L. mid PZ\", and consists of three  slightly stringy pale tan soft tissue cores, ranging in length from 0.9  cm to 1.4 cm, and each 0.1 cm in diameter. Entirely submitted in  cassette A1.    B: The specimen is received in formalin with proper patient  identification, labeled \"prostate, LB\", and consists of two pale tan  soft tissue cores, each 1.3 cm in length x 0.1 cm in diameter. Entirely  submitted in cassette B1.    C: The specimen is received in formalin with proper patient  identification, labeled \"prostate, LM\", and consists of two slightly  stringy pale tan soft tissue cores, 1.3 cm and 1.4 cm in length, and  each 0.1 cm in diameter. Entirely submitted in cassette C1.    D: The specimen is received in formalin with proper patient  identification, labeled \"prostate, LA\", and consists of two slightly  stringy pale tan soft tissue cores, 1.0 cm and 1.2 cm in length, and  each 0.1 cm in diameter. Entirely submitted in cassette D1.    E:  The specimen is received in formalin with proper patient  identification, labeled \"prostate, RB\", and consists of two stringy and  friable pale tan soft tissue cores, 0.8 cm and 1.1 cm in length, and  each 0.1 cm in diameter. Entirely submitted in cassette E1.    F: The specimen is received in formalin with proper patient  identification, labeled \"prostate, RM\", and consists of two slightly  stringy pale tan soft tissue cores, 1.5 cm and 1.6 cm in length, and  each 0.1 cm in diameter. Entirely submitted in cassette F1.    G: The specimen is received in " "formalin with proper patient  identification, labeled \"prostate, RA\", and consists of two slightly  stringy pale tan soft tissue cores, 1.1 cm and 1.5 cm in length, and  each 0.1 cm in diameter. Entirely submitted in cassette G1. (Dictated  by: Alycia Prince 3/22/2017 01:32 PM)    MICROSCOPIC:  Microscopic examination was performed.    CPT Codes:  A: 95284.051  B: 49234.051  C: 56150.051  D: 07661.051  E: 58399.051  F: 67562.051   .051    TESTING LAB LOCATION:  [facility], [address]   [address] [phone]    COLLECTION SITE:  Client: [facility]  Location: [facility] (B)         Thank you for choosing [facility] for your care. Please follow up as previously planned.  Please call with any questions or concerns.    Thank you,  Leydi Zimmerman, RN Care Coordinator for  Dr. Everton Mathur    "

## 2017-03-23 LAB — COPATH REPORT: NORMAL

## 2017-03-31 ENCOUNTER — PRE VISIT (OUTPATIENT)
Dept: UROLOGY | Facility: CLINIC | Age: 72
End: 2017-03-31

## 2017-03-31 NOTE — TELEPHONE ENCOUNTER
Patient with a positive biopsy coming in for results and to discuss a treatment plan. Chart reviewed and all records available. No need for a call.

## 2017-04-05 ENCOUNTER — ALLIED HEALTH/NURSE VISIT (OUTPATIENT)
Dept: UROLOGY | Facility: CLINIC | Age: 72
End: 2017-04-05

## 2017-04-05 ENCOUNTER — OFFICE VISIT (OUTPATIENT)
Dept: UROLOGY | Facility: CLINIC | Age: 72
End: 2017-04-05

## 2017-04-05 VITALS
HEIGHT: 67 IN | SYSTOLIC BLOOD PRESSURE: 151 MMHG | BODY MASS INDEX: 23.98 KG/M2 | DIASTOLIC BLOOD PRESSURE: 90 MMHG | HEART RATE: 57 BPM | WEIGHT: 152.8 LBS

## 2017-04-05 DIAGNOSIS — C61 MALIGNANT NEOPLASM OF PROSTATE (H): Primary | ICD-10-CM

## 2017-04-05 RX ORDER — HEPARIN SODIUM 5000 [USP'U]/.5ML
5000 INJECTION, SOLUTION INTRAVENOUS; SUBCUTANEOUS ONCE
Status: CANCELLED | OUTPATIENT
Start: 2017-04-05 | End: 2017-04-05

## 2017-04-05 RX ORDER — CEFAZOLIN SODIUM 1 G/3ML
1 INJECTION, POWDER, FOR SOLUTION INTRAMUSCULAR; INTRAVENOUS SEE ADMIN INSTRUCTIONS
Status: CANCELLED | OUTPATIENT
Start: 2017-04-05

## 2017-04-05 ASSESSMENT — PAIN SCALES - GENERAL: PAINLEVEL: NO PAIN (0)

## 2017-04-05 NOTE — PROGRESS NOTES
"Urology Clinic Note      Date: 4/5/2017  Time: 11:06 AM  Patient: Eduardo Whitley  MRN: [id number]    HPI/Subjective: Eduardo Whitley is a 72 year old male with an unremarkable past medical history who presented to clinic on 3/21/17 with gradually increasing PSA:    PSA (9/20/16): 7.07  PSA (1/27/17): 8.44  PSA (3/15/17): 8.48    CT Urogram on 10/18/16 did not show evidence of prostate cancer. Cystoscopy at that time was nromal. MRI on 2/2/16 revealed PI-RADS of 3 with a hypointensity of the left peripheral zone of his prostate. Given his MRI and PSA findings, he agreed to MRI fusion biopsy, which was performed on 3/22/17. This showed Nabeel 4+4=8 in his left mid PZ, as well as 3 areas of 4+3=7 of his left base, left mid, and left apex. Further results below.    He experienced some blood in his semen following the biopsy, but this has cleared. He denies hematuria, hematochezia, fevers, chills, or further symptoms. He has no further physical concerns at this time.     Objective:  /90  Pulse 57  Ht 1.689 m (5' 6.5\")  Wt 69.3 kg (152 lb 12.8 oz)  BMI 24.29 kg/m2  GENERAL: No acute distress. Well nourished.   HEENT:  Sclerae anicteric.  Conjunctivae pink.  Moist mucous membranes.  NECK:  No lymphadenopathy.  CARDIAC:  No JVP  LUNGS:  Non-labored breathing  BACK:  No costovertebral tenderness.  SKIN: No rashes.  Dry.     NEURO: normal gait, no focal deficits.     Laboratory Results:  Prostate Biopsy 3/22/17:  FINAL DIAGNOSIS:   A. Prostate, left mid PZ, biopsy:   - Prostatic adenocarcinoma, acinar type   - Grade group 4   - [place] score 8 (4+4)   - Extent: Involves one core (0.5 mm; <5%)     B. Prostate, left base, biopsy:   - Prostatic adenocarcinoma, acinar type   - Grade group 3   - Nabeel score 7 (4+3)   - Extent: Involves two cores (10 mm; 90%; 10 mm, 85%)     C. Prostate, left mid, biopsy:   - Prostatic adenocarcinoma, acinar type   - Grade group 3   - Nabeel score 7 (4+3)   - Extent: Involves two " cores (9.5 mm; 90%; 8 mm, 70%)     D. Prostate, left apex, biopsy:   - Prostatic adenocarcinoma, acinar type   - Grade group 3   - Nabeel score 7 (4+3)   - Extent: Involves two cores (10 mm; 90%; 5 mm, 50%)     E. Prostate, right base, biopsy:   - High-grade prostatic intraepithelial neoplasm (HGPIN)     F. Prostate, right mid, biopsy:   - Benign prostatic tissue     G. Prostate, right apex, biopsy:   - High-grade prostatic intraepithelial neoplasm (HGPIN)       Assessment & Plan: Eduardo Whitley is a 72 year old male with a recent diagnosis of prostate cancer with 1 core of GL 4+4 in <5% of Left mid peripheral zone as well as 3 areas of GL 4+3. Main treatment options discussed today included robotic radical prostatectomy, radiation therapy and androgen deprivation therapy. Risks and benefits of each of these treatment options were discussed in depth, and he voiced understanding. He wishes to proceed with radical prostatectomy.     - Schedule for robotic radical prostatectomy    - Anesthesia consult    - Bone scan     I, Javier Villa, MS3, am acting as scribe for Dr. Everton Mathur MD.     Patient seen and examined by me.  I agree with the medical student's documentation of the encounter.   I obtained the history and examined the patient and student only acted as a scribe.  Total visit time 15 minutes with >50% spent in counseling  Everton Mathur MD

## 2017-04-05 NOTE — MR AVS SNAPSHOT
After Visit Summary   4/5/2017    Eduardo Whitley    MRN: [id number]           Patient Information     Date Of Birth          1945        Visit Information        Provider Department      4/5/2017 11:45 AM Nurse, [facility] and [facility]        Today's Diagnoses     Malignant neoplasm of prostate (H)    -  1       Follow-ups after your visit        Your next 10 appointments already scheduled     Apr 06, 2017 10:00 AM CDT   NM INJECTION with UUNMINJ2   [facility], Nuclear Medicine ([facility])    [address] [place] [id number] [phone]            Apr 06, 2017  1:00 PM CDT   NM BONE SCAN WHOLE BODY with UUNM2   [facility], Nuclear Medicine ([facility])    [address] [place] [id number] [phone]           Please bring a list of your medicines to the exam. (Include vitamins, minerals and over-the-counter drugs.) You should wear comfortable clothes. Leave your valuables at home. Please bring related prior results and films. Tell your doctor:   If you are breastfeeding or may be pregnant.   If you have had a barium test within the past few days. Barium may change the results of certain exams.   If you think you may need sedation (medicine to help you relax).  You may eat and drink as normal.  Drink plenty of fluids and empty bladder frequently between injection and scans.            May 31, 2017  3:30 PM CDT   (Arrive by 3:15 PM)   Post-Op with Aidan Biggs MD   [facility]rology and [facility] ([facility])    [address] [id number] [phone]              Future tests that were ordered for you today     Open Future Orders        Priority Expected Expires Ordered    X-Ray Cystogram Routine 5/31/2017 4/5/2018  4/5/2017    NM Bone Scan Whole Body Routine  4/5/2018 4/5/2017            Who to contact     Please call your clinic at [phone] to:    Ask questions about your health    Make or cancel appointments    Discuss your medicines    Learn about your test results    Speak to your doctor   If you have compliments or concerns about an experience at your clinic, or if you wish to file a complaint, please contact [facility] Patient Relations at [phone] or email us at [email or web address]         Additional Information About Your Visit        [employer] Information     [employer] gives you secure access to your electronic health record. If you see a primary care provider, you can also send messages to your care team and make appointments. If you have questions, please call your primary care clinic.  If you do not have a primary care provider, please call [phone] and they will assist you.      [employer] is an electronic gateway that provides easy, online access to your medical records. With [employer], you can request a clinic appointment, read your test results, renew a prescription or communicate with your care team.     To access your existing account, please contact your [facility] or call [phone] for assistance.        Care EveryWhere ID     This is your Care EveryWhere ID. This could be used by other organizations to access your [place] medical records  [id number]         Blood Pressure from Last 3 Encounters:   04/05/17 151/90   03/22/17 (!) 174/97   03/21/17 (!) 153/100    Weight from Last 3 Encounters:   04/05/17 69.3 kg (152 lb 12.8 oz)   03/22/17 64.9 kg (143 lb)   03/21/17 64.9 kg (143 lb)              Today, you had the following     No orders found for display         Today's Medication Changes          These changes are accurate as of: 4/5/17  1:54 PM.  If you have any questions, ask your nurse or doctor.               Stop taking  these medicines if you haven't already. Please contact your care team if you have questions.     ciprofloxacin 500 MG tablet   Commonly known as:  CIPRO   Stopped by:  Everton Mathur MD                    Primary Care Provider    Provider Unknown       No address on file        Thank you!     Thank you for choosing [facility] UROLOGY AND [facility] FOR PROSTATE AND UROLOGIC CANCERS  for your care. Our goal is always to provide you with excellent care. Hearing back from our patients is one way we can continue to improve our services. Please take a few minutes to complete the written survey that you may receive in the mail after your visit with us. Thank you!             Your Updated Medication List - Protect others around you: Learn how to safely use, store and throw away your medicines at www.disposemymeds.org.          This list is accurate as of: 4/5/17  1:54 PM.  Always use your most recent med list.                   Brand Name Dispense Instructions for use    Multiple vitamin Tabs      Take 1 tablet by mouth       Saw Palmetto 160 MG Caps      Take 480 mg by mouth

## 2017-04-05 NOTE — NURSING NOTE
"Chief Complaint   Patient presents with     RECHECK     Prostate biopsy results and discuss treatment plan       Blood pressure 151/90, pulse 57, height 1.689 m (5' 6.5\"), weight 69.3 kg (152 lb 12.8 oz). Body mass index is 24.29 kg/(m^2).    Patient Active Problem List   Diagnosis     Elevated prostate specific antigen (PSA)       No Known Allergies    Current Outpatient Prescriptions   Medication Sig Dispense Refill     Multiple vitamin TABS Take 1 tablet by mouth       Saw Palmetto 160 MG CAPS Take 480 mg by mouth         Social History   Substance Use Topics     Smoking status: Never Smoker     Smokeless tobacco: Never Used     Alcohol use Not on file       TIFFANY Perez  4/5/2017  10:46 AM       "

## 2017-04-05 NOTE — MR AVS SNAPSHOT
After Visit Summary   4/5/2017    Eduardo Whitley    MRN: [id number]           Patient Information     Date Of Birth          1945        Visit Information        Provider Department      4/5/2017 11:00 AM Everton Mathur MD [facility]rology and [facility]        Today's Diagnoses     Malignant neoplasm of prostate (H)    -  1       Follow-ups after your visit        Additional Services     PAC Visit Referral (For [facility] Only)       Does this visit require an Anesthesia consult?  Yes - Evaluate for medical necessity related to one of the following conditions:      H&P done by:  N/A and Other (Specify): PAC      Please be aware that coverage of these services is subject to the terms and limitations of your health insurance plan.  Call member services at your health plan with any benefit or coverage questions.      Please bring the following to your appointment:  >>   Any x-rays, CTs or MRIs which have been performed.  Contact the facility where they were done to arrange for  prior to your scheduled appointment.  Any new CT, MRI or other procedures ordered by your specialist must be performed at a [place] facility or coordinated by your clinic's referral office.    >>   List of current medications  >>   This referral request   >>   Any documents/labs given to you for this referral                  Follow-up notes from your care team     Return in about 3 weeks (around 4/26/2017).      Your next 10 appointments already scheduled     Apr 06, 2017 10:00 AM CDT   NM INJECTION with UUNMINJ2   [facility] [place], Nuclear Medicine ([facility])    [address] [facility] [address] [phone]            Apr 06, 2017  1:00 PM CDT   NM BONE SCAN WHOLE BODY with UUNM2   [facility], Nuclear Medicine ([facility])    [address] [place] [phone]    [phone]           Please bring a list of your medicines to the exam. (Include vitamins, minerals and over-the-counter drugs.) You should wear comfortable clothes. Leave your valuables at home. Please bring related prior results and films. Tell your doctor:   If you are breastfeeding or may be pregnant.   If you have had a barium test within the past few days. Barium may change the results of certain exams.   If you think you may need sedation (medicine to help you relax).  You may eat and drink as normal.  Drink plenty of fluids and empty bladder frequently between injection and scans.              Future tests that were ordered for you today     Open Future Orders        Priority Expected Expires Ordered    NM Bone Scan Whole Body Routine  4/5/2018 4/5/2017            Who to contact     Please call your clinic at [phone] to:    Ask questions about your health    Make or cancel appointments    Discuss your medicines    Learn about your test results    Speak to your doctor   If you have compliments or concerns about an experience at your clinic, or if you wish to file a complaint, please contact [facility] Patient Relations at [phone] or email us at [email or web address]         Additional Information About Your Visit        [employer] Information     [employer] gives you secure access to your electronic health record. If you see a primary care provider, you can also send messages to your care team and make appointments. If you have questions, please call your primary care clinic.  If you do not have a primary care provider, please call [phone] and they will assist you.      [employer] is an electronic gateway that provides easy, online access to your medical records. With [employer], you can request a clinic appointment, read your test results, renew a prescription or communicate with your care team.     To access your existing account, please contact your [facility]  "[facility] or call [phone] for assistance.        Care EveryWhere ID     This is your Care EveryWhere ID. This could be used by other organizations to access your [place] medical records  [id number]        Your Vitals Were     Pulse Height BMI (Body Mass Index)             57 1.689 m (5' 6.5\") 24.29 kg/m2          Blood Pressure from Last 3 Encounters:   04/05/17 151/90   03/22/17 (!) 174/97   03/21/17 (!) 153/100    Weight from Last 3 Encounters:   04/05/17 69.3 kg (152 lb 12.8 oz)   03/22/17 64.9 kg (143 lb)   03/21/17 64.9 kg (143 lb)              We Performed the Following     PAC Visit Referral (For [facility] Only)     Lucia-Operative Worksheet          Today's Medication Changes          These changes are accurate as of: 4/5/17 12:02 PM.  If you have any questions, ask your nurse or doctor.               Stop taking these medicines if you haven't already. Please contact your care team if you have questions.     ciprofloxacin 500 MG tablet   Commonly known as:  CIPRO   Stopped by:  Everton Mathur MD                    Primary Care Provider    None Specified       No primary provider on file.        Thank you!     Thank you for choosing [facility] UROLOGY AND [facility] FOR PROSTATE AND UROLOGIC CANCERS  for your care. Our goal is always to provide you with excellent care. Hearing back from our patients is one way we can continue to improve our services. Please take a few minutes to complete the written survey that you may receive in the mail after your visit with us. Thank you!             Your Updated Medication List - Protect others around you: Learn how to safely use, store and throw away your medicines at www.disposemymeds.org.          This list is accurate as of: 4/5/17 12:02 PM.  Always use your most recent med list.                   Brand Name Dispense Instructions for use    Multiple vitamin Tabs      Take 1 tablet by mouth       Saw Palmetto 160 MG Caps      Take 480 mg by mouth    "

## 2017-04-05 NOTE — NURSING NOTE
Pre Op Teaching Flowsheet       Pre and Post op Patient Education  Relevant Diagnosis:  Prostate cancer  Surgical procedure:  Robotic radical prostatectomy bilateral pelvic lymphadenectomy  Teaching Topic:  Pre and post op teaching  Person Involved in teaching: Eduardo Whitley    Motivation Level:  Asks Questions: Yes  Eager to Learn:  Yes  Cooperative: Yes  Receptive (willing/able to accept information):  Yes    Patient demonstrates understanding of the following:  Date of surgery:  5/19/17  Location of surgery:  [address] [place] [address]  History and Physical and any other testing necessary prior to surgery: Yes  Required time line for completion of History and Physical and any pre-op testing: Yes    Patient demonstrates understanding of the following:  Pre-op bowel prep: None needed  Pre-op showering/scrub information with PCMX Soap: Yes  Blood thinner medications discussed and when to stop (if applicable):  Yes      Infection Prevention:   Patient demonstrates understanding of the following:  Surgical procedure site care taught: at time of discharge  Signs and symptoms of infection taught:  Yes      Post-op follow-up:  Discussed how to contact the hospital, nurse, and clinic scheduling staff if necessary.    Instructional materials used/given/mailed:  [facility] Booklet, post op teaching sheet, Map, Soap, and arrival/location information.    Surgical instructions packet given to patient in office:  Yes.

## 2017-04-06 ENCOUNTER — HOSPITAL ENCOUNTER (OUTPATIENT)
Dept: NUCLEAR MEDICINE | Facility: CLINIC | Age: 72
Setting detail: NUCLEAR MEDICINE
Discharge: HOME | End: 2017-04-06
Attending: UROLOGY | Admitting: UROLOGY
Payer: MEDICARE

## 2017-04-06 ENCOUNTER — HOSPITAL ENCOUNTER (OUTPATIENT)
Dept: NUCLEAR MEDICINE | Facility: CLINIC | Age: 72
Setting detail: NUCLEAR MEDICINE
End: 2017-04-06
Attending: UROLOGY
Payer: MEDICARE

## 2017-04-06 DIAGNOSIS — C61 MALIGNANT NEOPLASM OF PROSTATE (H): ICD-10-CM

## 2017-04-06 PROCEDURE — A9503 TC99M MEDRONATE: HCPCS | Performed by: UROLOGY

## 2017-04-06 PROCEDURE — 78306 BONE IMAGING WHOLE BODY: CPT

## 2017-04-06 PROCEDURE — 34300033 ZZH RX 343: Performed by: UROLOGY

## 2017-04-06 RX ORDER — TC 99M MEDRONATE 20 MG/10ML
20-30 INJECTION, POWDER, LYOPHILIZED, FOR SOLUTION INTRAVENOUS ONCE
Status: COMPLETED | OUTPATIENT
Start: 2017-04-06 | End: 2017-04-06

## 2017-04-06 RX ADMIN — TC 99M MEDRONATE 24.16 MCI.: 20 INJECTION, POWDER, LYOPHILIZED, FOR SOLUTION INTRAVENOUS at 12:33

## 2017-04-11 DIAGNOSIS — C61 MALIGNANT NEOPLASM OF PROSTATE (H): Primary | ICD-10-CM

## 2017-05-05 ENCOUNTER — OFFICE VISIT (OUTPATIENT)
Dept: SURGERY | Facility: CLINIC | Age: 72
End: 2017-05-05

## 2017-05-05 ENCOUNTER — ANESTHESIA EVENT (OUTPATIENT)
Dept: SURGERY | Facility: CLINIC | Age: 72
DRG: 708 | End: 2017-05-05
Payer: MEDICARE

## 2017-05-05 ENCOUNTER — ALLIED HEALTH/NURSE VISIT (OUTPATIENT)
Dept: SURGERY | Facility: CLINIC | Age: 72
End: 2017-05-05

## 2017-05-05 DIAGNOSIS — C61 PROSTATE CANCER (H): ICD-10-CM

## 2017-05-05 DIAGNOSIS — Z01.818 PRE-OP EXAMINATION: Primary | ICD-10-CM

## 2017-05-05 LAB
ALBUMIN UR-MCNC: NEGATIVE MG/DL
APPEARANCE UR: CLEAR
BACTERIA #/AREA URNS HPF: ABNORMAL /HPF
BILIRUB UR QL STRIP: NEGATIVE
COLOR UR AUTO: YELLOW
ERYTHROCYTE [DISTWIDTH] IN BLOOD BY AUTOMATED COUNT: 13.4 % (ref 10–15)
GLUCOSE UR STRIP-MCNC: NEGATIVE MG/DL
HCT VFR BLD AUTO: 45.2 % (ref 40–53)
HGB BLD-MCNC: 14.5 G/DL (ref 13.3–17.7)
HGB UR QL STRIP: ABNORMAL
HYALINE CASTS #/AREA URNS LPF: 1 /LPF (ref 0–2)
INR PPP: 1.01 (ref 0.86–1.14)
KETONES UR STRIP-MCNC: NEGATIVE MG/DL
LEUKOCYTE ESTERASE UR QL STRIP: NEGATIVE
MCH RBC QN AUTO: 30.3 PG (ref 26.5–33)
MCHC RBC AUTO-ENTMCNC: 32.1 G/DL (ref 31.5–36.5)
MCV RBC AUTO: 94 FL (ref 78–100)
MUCOUS THREADS #/AREA URNS LPF: PRESENT /LPF
NITRATE UR QL: NEGATIVE
PH UR STRIP: 5 PH (ref 5–7)
PLATELET # BLD AUTO: 236 10E9/L (ref 150–450)
POTASSIUM SERPL-SCNC: 4.5 MMOL/L (ref 3.4–5.3)
RBC # BLD AUTO: 4.79 10E12/L (ref 4.4–5.9)
RBC #/AREA URNS AUTO: 37 /HPF (ref 0–2)
SP GR UR STRIP: 1.02 (ref 1–1.03)
URN SPEC COLLECT METH UR: ABNORMAL
UROBILINOGEN UR STRIP-MCNC: 0 MG/DL (ref 0–2)
WBC # BLD AUTO: 8.5 10E9/L (ref 4–11)
WBC #/AREA URNS AUTO: 1 /HPF (ref 0–2)

## 2017-05-05 ASSESSMENT — LIFESTYLE VARIABLES: TOBACCO_USE: 0

## 2017-05-05 NOTE — PATIENT INSTRUCTIONS
Preparing for Your Surgery      Name:  Eduardo Whitley   MRN:  [id number]   :  1945   Today's Date:  2017     Arriving for surgery:  Surgery date:  2017  Surgery time:  745 am  Arrival time:     545 am      Please come to:       [facility] [address]  [address] [place]  [address]    -   parking is available in front of the hospital from 5:15 am to 8:00 pm    -  Stop at the Information Desk in the lobby    -   Inform the information person that you are here for surgery. An escort to 3c will be provided. If you would not like an escort, please proceed to 3C on the 3rd floor. [phone]     What can I eat or drink?  Clear liquids starting on ; no red or purple; until 12 midnight  -  You may have water  until 545 am when you get to the hospital on     Which medicines can I take?        -  Please take these medications the day of surgery:  NONE      How do I prepare myself?  -  Take two showers: one the night before surgery; and one the morning of surgery.         Use Scrubcare or Hibiclens to wash from neck down.  You may use your own shampoo and conditioner. No other hair products.   -  Do NOT use lotion, powder, deodorant, or antiperspirant the day of your surgery.  -  Do NOT wear any makeup, fingernail polish or jewelry.    -Do not bring your own medications to the hospital, except for inhalers and eye drops.  -  Bring your ID and insurance card.    Questions or Concerns:  If you have questions or concerns, please call the  Preoperative Assessment Center, Monday-Friday 7AM-7PM:  [phone]    AFTER YOUR SURGERY  Breathing exercises   Breathing exercises help you recover faster. Take deep breaths and let the air out slowly. This will:     Help you wake up after surgery.    Help prevent complications like pneumonia.  Preventing complications will help you go home sooner.   We may give you a breathing device (incentive spirometer) to  encourage you to breathe deeply.   Nausea and vomiting   You may feel sick to your stomach after surgery; if so, let your nurse know.    Pain control:  After surgery, you may have pain. Our goal is to help you manage your pain. Pain medicine will help you feel comfortable enough to do activities that will help you heal.  These activities may include breathing exercises, walking and physical therapy.   To help your health care team treat your pain we will ask: 1) If you have pain  2) where it is located 3) describe your pain in your words  Methods of pain control include medications given by mouth, vein or by nerve block for some surgeries.  We may give you a pain control pump that will:  1) Deliver the medicine through a tube placed in your vein  2) Control the amount of medicine you receive  3) Allow you to push a button to deliver a dose of pain medicine  Sequential Compression Device (SCD) or Pneumo Boots:  You may need to wear SCD S on your legs or feet. These are wraps connected to a machine that pumps in air and releases it. The repeated pumping helps prevent blood clots from forming.

## 2017-05-05 NOTE — MR AVS SNAPSHOT
After Visit Summary   2017    Eduardo Whitley    MRN: [id number]           Patient Information     Date Of Birth          1945        Visit Information        Provider Department      2017 2:30 PM Rn, [facility] Preoperative Assessment Center        Care Instructions    Preparing for Your Surgery      Name:  Eduardo Whitley   MRN:  [id number]   :  1945   Today's Date:  2017     Arriving for surgery:  Surgery date:  2017  Surgery time:  745 am  Arrival time:     545 am      Please come to:       [facility] [address]  [address] [place] [address]    -   parking is available in front of the hospital from 5:15 am to 8:00 pm    -  Stop at the Information Desk in the lobby    -   Inform the information person that you are here for surgery. An escort to 3c will be provided. If you would not like an escort, please proceed to 3C on the 3rd floor. [phone]     What can I eat or drink?  Clear liquids starting on ; no red or purple; until 12 midnight  -  You may have water  until 545 am when you get to the hospital on     Which medicines can I take?        -  Please take these medications the day of surgery:  NONE      How do I prepare myself?  -  Take two showers: one the night before surgery; and one the morning of surgery.         Use Scrubcare or Hibiclens to wash from neck down.  You may use your own shampoo and conditioner. No other hair products.   -  Do NOT use lotion, powder, deodorant, or antiperspirant the day of your surgery.  -  Do NOT wear any makeup, fingernail polish or jewelry.    -Do not bring your own medications to the hospital, except for inhalers and eye drops.  -  Bring your ID and insurance card.    Questions or Concerns:  If you have questions or concerns, please call the  Preoperative Assessment Center, Monday-Friday 7AM-7PM:  [phone]    AFTER YOUR SURGERY  Breathing exercises    Breathing exercises help you recover faster. Take deep breaths and let the air out slowly. This will:     Help you wake up after surgery.    Help prevent complications like pneumonia.  Preventing complications will help you go home sooner.   We may give you a breathing device (incentive spirometer) to encourage you to breathe deeply.   Nausea and vomiting   You may feel sick to your stomach after surgery; if so, let your nurse know.    Pain control:  After surgery, you may have pain. Our goal is to help you manage your pain. Pain medicine will help you feel comfortable enough to do activities that will help you heal.  These activities may include breathing exercises, walking and physical therapy.   To help your health care team treat your pain we will ask: 1) If you have pain  2) where it is located 3) describe your pain in your words  Methods of pain control include medications given by mouth, vein or by nerve block for some surgeries.  We may give you a pain control pump that will:  1) Deliver the medicine through a tube placed in your vein  2) Control the amount of medicine you receive  3) Allow you to push a button to deliver a dose of pain medicine  Sequential Compression Device (SCD) or Pneumo Boots:  You may need to wear SCD S on your legs or feet. These are wraps connected to a machine that pumps in air and releases it. The repeated pumping helps prevent blood clots from forming.               Follow-ups after your visit        Your next 10 appointments already scheduled     May 19, 2017   Procedure with Everton Mathur MD   [facility] [place], Same Day Surgery (--)    [address]  [facility] [address] [phone]            May 31, 2017  2:30 PM CDT   XR CYSTOGRAM with JHON2SKY RAD, UC IMAGING NURSE   [facility] ([facility])    [address]  1st Floor  [facility] [id number] [phone]           Please bring a list of your current  medicines to your exam. (Include vitamins, minerals and over-thecounter medicines.) Leave your valuables at home.  Tell your doctor if there is a chance you may be pregnant.  You do not need to do anything special for this exam.            May 31, 2017  3:30 PM CDT   (Arrive by 3:15 PM)   Post-Op with Aidan Biggs MD   [facility] ([facility])    [address]  [address] [facility] [address] [phone]              Future tests that were ordered for you today     Open Future Orders        Priority Expected Expires Ordered    UA reflex to Microscopic and Culture Routine 5/5/2017 6/4/2017 5/5/2017    ABO/Rh type and screen Routine 5/5/2017 6/4/2017 5/5/2017    CBC with platelets Routine 5/5/2017 6/4/2017 5/5/2017    INR Routine 5/5/2017 6/4/2017 5/5/2017    Potassium Routine 5/5/2017 6/4/2017 5/5/2017            Who to contact     Please call your clinic at [phone] to:    Ask questions about your health    Make or cancel appointments    Discuss your medicines    Learn about your test results    Speak to your doctor   If you have compliments or concerns about an experience at your clinic, or if you wish to file a complaint, please contact [facility] Patient Relations at [phone] or email us at [email or web address]         Additional Information About Your Visit        Keep Your Pharmacy [facility] Information     [employer] gives you secure access to your electronic health record. If you see a primary care provider, you can also send messages to your care team and make appointments. If you have questions, please call your primary care clinic.  If you do not have a primary care provider, please call [phone] and they will assist you.      [employer] is an electronic gateway that provides easy, online access to your medical records. With [employer], you can request a clinic appointment, read your test  results, renew a prescription or communicate with your care team.     To access your existing account, please contact your [facility] or call [phone] for assistance.        Care EveryWhere ID     This is your Care EveryWhere ID. This could be used by other organizations to access your [place] medical records  [id number]         Blood Pressure from Last 3 Encounters:   05/05/17 (!) 179/96   04/05/17 151/90   03/22/17 (!) 174/97    Weight from Last 3 Encounters:   05/05/17 67.5 kg (148 lb 14.4 oz)   04/05/17 69.3 kg (152 lb 12.8 oz)   03/22/17 64.9 kg (143 lb)              Today, you had the following     No orders found for display       Primary Care Provider    Physician No Ref-Primary       No address on file        Thank you!     Thank you for choosing [facility] PREOPERATIVE ASSESSMENT [place]  for your care. Our goal is always to provide you with excellent care. Hearing back from our patients is one way we can continue to improve our services. Please take a few minutes to complete the written survey that you may receive in the mail after your visit with us. Thank you!             Your Updated Medication List - Protect others around you: Learn how to safely use, store and throw away your medicines at www.disposemymeds.org.          This list is accurate as of: 5/5/17  3:19 PM.  Always use your most recent med list.                   Brand Name Dispense Instructions for use    Multiple vitamin Tabs      Take 1 tablet by mouth every evening       Saw Palmetto 160 MG Caps      Take 480 mg by mouth every evening

## 2017-05-05 NOTE — ANESTHESIA PREPROCEDURE EVALUATION
Anesthesia Evaluation     . Pt has had prior anesthetic. Type: General    No history of anesthetic complications          ROS/MED HX    ENT/Pulmonary:      (-) tobacco use   Neurologic:  - neg neurologic ROS     Cardiovascular:  - neg cardiovascular ROS   (+) ----. : . . . :. . No previous cardiac testing       METS/Exercise Tolerance:  >4 METS   Hematologic:  - neg hematologic  ROS       Musculoskeletal:   (+) , , other musculoskeletal (Left shoulder surgery 2016.  Right shoulder surgery 2013)-       GI/Hepatic: Comment: Bilateral inguinal hernia repair with mesh 2008        Renal/Genitourinary:     (+) Other Renal/ Genitourinary,       Endo:  - neg endo ROS       Psychiatric:  - neg psychiatric ROS       Infectious Disease:  - neg infectious disease ROS       Malignancy:   (+) Malignancy History of Prostate  Prostate CA Active status post,         Other:    (+) No chance of pregnancy C-spine cleared: N/A, no H/O Chronic Pain,no other significant disability                    Physical Exam  Normal systems: cardiovascular and pulmonary    Airway   Mallampati: I  TM distance: >3 FB  Neck ROM: full    Dental   (+) caps  Comment: Dentition in good repair.    Cardiovascular   Rhythm and rate: regular and normal      Pulmonary    breath sounds clear to auscultation    Other findings: Labs:  Lab Results      Component                Value               Date                      WBC                      8.5                 05/05/2017            Lab Results      Component                Value               Date                      RBC                      4.79                05/05/2017            Lab Results      Component                Value               Date                      HGB                      14.5                05/05/2017            Lab Results      Component                Value               Date                      HCT                      45.2                05/05/2017            Lab Results       Component                Value               Date                      MCV                      94                  05/05/2017            Lab Results      Component                Value               Date                      MCH                      30.3                05/05/2017            Lab Results      Component                Value               Date                      MCHC                     32.1                05/05/2017            Lab Results      Component                Value               Date                      RDW                      13.4                05/05/2017            Lab Results      Component                Value               Date                      PLT                      236                 05/05/2017              Last Basic Metabolic Panel:  Lab Results      Component                Value               Date                      POTASSIUM                4.5                 05/05/2017 4/25/2017  Cr 0.9, GFR 83      Color Urine (no units)       Date                     Value                 05/05/2017               Yellow           ----------  Appearance Urine (no units)       Date                     Value                 05/05/2017               Clear            ----------  Glucose Urine (mg/dL)       Date                     Value                 05/05/2017               Negative         ----------  Bilirubin Urine (no units)       Date                     Value                 05/05/2017               Negative         ----------  Ketones Urine (mg/dL)       Date                     Value                 05/05/2017               Negative         ----------  Specific Gravity Urine (no units)       Date                     Value                 05/05/2017               1.019            ----------  pH Urine (pH)       Date                     Value                 05/05/2017               5.0              ----------  Protein Albumin Urine (mg/dL)       Date                      Value                 05/05/2017               Negative         ----------  Nitrite Urine (no units)       Date                     Value                 05/05/2017               Negative         ----------  Leukocyte Esterase Urine (no units)       Date                     Value                 05/05/2017               Negative         ----------    ECG 5/5/2017:  Sinus Bradycardia 49 BPM with first degree AVB, P interval 232 ms.           PAC Discussion and Assessment    ASA Classification: 3  Case is suitable for: [place]  Anesthetic techniques and relevant risks discussed: GA  Invasive monitoring and risk discussed:   Types:   Possibility and Risk of blood transfusion discussed: Yes  NPO instructions given:   Additional anesthetic preparation and risks discussed:   Needs early admission to pre-op area:   Other:     PAC Resident/NP Anesthesia Assessment:  Eduardo Whitley is a 72 year old male scheduled for DaVinci Assisted Radical Prostatectomy, Bilateral Pelvic Lymphadenectomy with Dr. Mathur on 5/19/2017 at [facility] under general anesthesia.  Mr. Whitley was referred to Urology clinic with Dr. Mathru on 4/5/2017 for gradually increasing PSA.  He underwent MRI fusion biopsy on 3/22/2017 and was positive for prostatic adenocarcinoma.  Given this, Dr. Mathur recommend the above surgery.      Mr. Whitley presents to PAC clinic and reports recent elevated blood pressure and feeling quite anxious with his recent diagnosis.  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  He denies any chest pain, dizziness, dyspnea, fever, cough, chills, sore throat, dysuria, hematuria or changes in his bowel or bladder function.  He would like to proceed with above surgical intervention as above.    PAC  "referral for risk assessment and optimization of anesthesia with comorbid conditions of:  Bilateral inguinal hernia repair with mesh, 2008; right rotator cuff surgery, 2013; left rotator cuff surgery, 2016.     Cardiology - RCRI : No serious cardiac risks.  0.4 % risk of major adverse cardiac event. METS >4.  Patient quite anxious regarding his diagnosis,\"I have cancer\" with BP reflective of this.  Rechecked BP at end of visit and was still elevated, 170/94.  Instructed to contact PCP and have it further evaluated for medication management within the next few days.  Pulmonary - no smoking history  GI - s/p inguinal hernia repair with mesh, 2008.  Musculoskeletal - Recommend careful positioning given history of bilateral rotator cuff surgeries.   - prostatic adenocarcinoma, see HPI.  Describes what sounds like hypospadia, see HPI.  Neuro - Anxiety with recent diagnosis.  Instructed to follow-up with PCP and be evaluated for situational anxiety/depression.    He has the following specific operative considerations:   - Anesthesia considerations:  Refer to PAC assessment in anesthesia records  - VTE risk:  4.5%  - KERWIN # of risks 3/8 = intermediate  - Post-op delirium risk: elevated given age  - Risk of PONV score = 1.  If > 2, anti-emetic intervention recommended.    Arrival time, NPO, shower and medication instructions provided by nursing staff today.  Preparing For Your Surgery handout given.  Patient was discussed with Dr Zee. I spent 20 minutes face to face with patient, assessing, examining, and educating.      Reviewed and Signed by PAC Mid-Level Provider/Resident  Mid-Level Provider/Resident: Megan UMAÑA CNP  Date: 5/5/2017  Time:     Attending Anesthesiologist Anesthesia Assessment:  Patient discussed with Dr. Zee.  Agree with PAC assessment above.  Eduardo Whitley is an otherwise healthy 72 year old male who is scheduled for DaVinci Assisted Radical Prostatectomy, Bilateral Pelvic Lymphadenectomy " with Dr. Mathur on 5/19/2017.   No significant cardiac history.  EKG today significant for bradycardia with 1st degree AV block.  Patient exercises regularly, denies CP or SOB.  Patient has been hypertensive during past 3 visits but has not yet been started on antihypertensive medications.  Encouraged patent to contact his PCP prior to surgery for evaluation. Feasible airway.  Mallampati I, full neck ROM, no missing teeth.  Patient denies history of smoking, COPD or asthma.  Patient denies history of prior anesthesia complications, however he is very concerned about post surgical constipation.  Discussed limiting opioids as tolerated and consideration of multimodal analgesia including pre-op acetaminophen and post op ketorolac.  Invited and answered patient questions and concerns.  Patient is medically optimized for the above stated procedure without additional work-up.  Final anesthesia plan will be determined by staff anesthesiologist on day of surgery.    Evin Menard Jr., MD  Anesthesia Resident - [facility]  5/5/2017  2:54 PM    I discussed the patient with the ERICK and Dr. Menard and agree with their notes above.  Final anesthetic plan and recommendations to be made by the attending anesthesiologist on the day of surgery.       Reviewed and Signed by PAC Anesthesiologist  Anesthesiologist: MARGARET  Date: 5/5/17  Time: 1501  Pass/Fail: Pass  Disposition:     PAC Pharmacist Assessment:        Pharmacist:   Date:   Time:      Anesthesia Plan      History & Physical Review      ASA Status:  2 .    NPO Status:  > 8 hours    Plan for General and ETT with Intravenous and Propofol induction. Maintenance will be Balanced.    PONV prophylaxis:  Ondansetron (or other 5HT-3)  Additional equipment: 2nd IV      Postoperative Care  Postoperative pain management:  IV analgesics and Multi-modal analgesia.      Consents  Anesthetic plan, risks, benefits and alternatives discussed with:  Patient and Spouse.  Use of blood products discussed:  Yes.   Use of blood products discussed with Patient and Spouse.  Consented to blood products.  .          72M owh with perioperative anxiety-induced HTN, on losartan for past 10 days.  No prior h/o HTN.  ASA 2.  Plan: GETA with PIV x2.  T&S.    Sabina Kaiser MD  Staff Anesthesiologist  Pager [phone]

## 2017-05-07 VITALS
HEIGHT: 66 IN | RESPIRATION RATE: 12 BRPM | OXYGEN SATURATION: 99 % | TEMPERATURE: 98.4 F | WEIGHT: 148.9 LBS | HEART RATE: 58 BPM | DIASTOLIC BLOOD PRESSURE: 94 MMHG | SYSTOLIC BLOOD PRESSURE: 170 MMHG | BODY MASS INDEX: 23.93 KG/M2

## 2017-05-07 NOTE — H&P
Pre-Operative H & P     CC:  Preoperative exam to assess for increased cardiopulmonary risk while undergoing surgery and anesthesia.    Date of Encounter: 5/7/2017  Primary Care Physician:  No Ref-Primary, Physician    HPI  Eduardo Whitley is a 72 year old male who presents for pre-operative H & P in preparation for DaVinci Assisted Radical Prostatectomy, Bilateral Pelvic Lymphadenectomy with Dr. Mathur on 5/19/2017 at [facility] under general anesthesia.  Mr. Whitley was referred to Urology clinic with Dr. Mathur on 4/5/2017 for gradually increasing PSA.  He underwent MRI fusion biopsy on 3/22/2017 and was positive for prostatic adenocarcinoma.  Given this, Dr. Mathur recommend the above surgery.      Mr. Whitley presents to PAC clinic and reports recent elevated blood pressure and feeling quite anxious with his recent diagnosis.  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  He denies any chest pain, dizziness, dyspnea, fever, cough, chills, sore throat, dysuria, hematuria or changes in his bowel or bladder function.  He would like to proceed with above surgical intervention as above.    PAC referral for risk assessment and optimization of anesthesia with comorbid conditions of:  Bilateral inguinal hernia repair with mesh, 2008; right rotator cuff surgery, 2013; left rotator cuff surgery, 2016.      History is obtained from the patient and electronic health record.     Past Medical History  Past Medical History:   Diagnosis Date     Rotator cuff tear        Past Surgical History  Past Surgical History:   Procedure Laterality Date     ADENOIDECTOMY       HERNIA REPAIR      bilat iniguinal hernia with mesh 2008     Left shoulder arthroscopy       ORTHOPEDIC SURGERY      right rotator cuff   surgery in 2013     ORTHOPEDIC SURGERY      left rotator cuff surgery 2016     Right shoulder arthroscopy       TONSILLECTOMY         Hx of Blood transfusions/reactions: denies     Hx of abnormal bleeding or anti-platelet use: denies    Menstrual history: No LMP for male patient.    Steroid use in the last year: denies    Personal or FH with difficulty with Anesthesia:  denies    Prior to Admission Medications  Current Outpatient Prescriptions   Medication Sig Dispense Refill     Multiple vitamin TABS Take 1 tablet by mouth every evening        Saw Sidney 160 MG CAPS Take 480 mg by mouth every evening          Allergies  No Known Allergies    Social History  Social History     Social History     Marital status:      Spouse name: Marga     Number of children: 3     Years of education: N/A     Occupational History     teacher      self-employed      Social History Main Topics     Smoking status: Never Smoker     Smokeless tobacco: Never Used     Alcohol use No     Drug use: Not on file     Sexual activity: Not on file     Other Topics Concern     Not on file     Social History Narrative       Family History  No family history on file.    Review of Systems  Preprocedure Note     Last edited 05/07/17 0806 by Megan Small APRN CNP             ROS/MED HX    ENT/Pulmonary:      (-) tobacco use   Neurologic:  - neg neurologic ROS     Cardiovascular:  - neg cardiovascular ROS   (+) ----. : . . . :. . No previous cardiac testing       METS/Exercise Tolerance:  >4 METS   Hematologic:  - neg hematologic  ROS       Musculoskeletal:   (+) , , other musculoskeletal (Left shoulder surgery 2016.  Right shoulder surgery 2013)-       GI/Hepatic: Comment: Bilateral inguinal hernia repair with mesh 2008        Renal/Genitourinary:     (+) Other Renal/ Genitourinary,       Endo:  - neg endo ROS       Psychiatric:  - neg psychiatric ROS       Infectious Disease:  - neg infectious disease ROS       Malignancy:   (+)  "Malignancy History of Prostate  Prostate CA Active status post,         Other:    (+) No chance of pregnancy C-spine cleared: N/A, no H/O Chronic Pain,no other significant disability                                   BP (!) 179/96  Pulse 58  Temp 98.4  F (36.9  C) (Oral)  Resp 12  Ht 1.676 m (5' 6\")  Wt 67.5 kg (148 lb 14.4 oz)  SpO2 99%  BMI 24.03 kg/m2                   Physical Exam  Constitutional: Awake, alert, cooperative, appears somewhat anxious, and appears stated age.  Eyes: Pupils equal, round and reactive to light, extra ocular muscles intact, sclera clear, conjunctiva normal.  HENT: Normocephalic, oral pharynx with moist mucus membranes, dentition in fair repair with evidence caps. No goiter appreciated.   Respiratory: Clear to auscultation bilaterally, no crackles or wheezing.  Cardiovascular: Regular rate and rhythm, normal S1 and S2, and no murmur noted.  Carotids +2, no bruits. No edema. Palpable pulses to radial  DP and PT arteries.   GI: Normal bowel sounds, soft, non-distended, non-tender, no masses palpated, no hepatosplenomegaly.  Lymph/Hematologic: No cervical lymphadenopathy and no supraclavicular lymphadenopathy.  Genitourinary:  na  Skin: Warm and dry.    Musculoskeletal: Full ROM of neck. There is no redness, warmth, or swelling of the joints. Gross motor strength is normal.    Neurologic: Awake, alert, oriented to name, place and time. Cranial nerves II-XII are grossly intact. Gait is normal.   Neuropsychiatric: Calm, cooperative. Normal affect.     Labs: (personally reviewed)  Lab Results      Component                Value               Date                      WBC                      8.5                 05/05/2017            Lab Results      Component                Value               Date                      RBC                      4.79                05/05/2017            Lab Results      Component                Value               Date                      HGB           "            14.5                05/05/2017            Lab Results      Component                Value               Date                      HCT                      45.2                05/05/2017            Lab Results      Component                Value               Date                      MCV                      94                  05/05/2017            Lab Results      Component                Value               Date                      MCH                      30.3                05/05/2017            Lab Results      Component                Value               Date                      MCHC                     32.1                05/05/2017            Lab Results      Component                Value               Date                      RDW                      13.4                05/05/2017            Lab Results      Component                Value               Date                      PLT                      236                 05/05/2017              Last Basic Metabolic Panel:  Lab Results      Component                Value               Date                      POTASSIUM                4.5                 05/05/2017 4/25/2017  Cr 0.9, GFR 83    Color Urine (no units)   Date Value   05/05/2017 Yellow     Appearance Urine (no units)   Date Value   05/05/2017 Clear     Glucose Urine (mg/dL)   Date Value   05/05/2017 Negative     Bilirubin Urine (no units)   Date Value   05/05/2017 Negative     Ketones Urine (mg/dL)   Date Value   05/05/2017 Negative     Specific Gravity Urine (no units)   Date Value   05/05/2017 1.019     pH Urine (pH)   Date Value   05/05/2017 5.0     Protein Albumin Urine (mg/dL)   Date Value   05/05/2017 Negative     Nitrite Urine (no units)   Date Value   05/05/2017 Negative     Leukocyte Esterase Urine (no units)   Date Value   05/05/2017 Negative       ECG 5/5/2017:  Sinus Bradycardia 49 BPM with first degree AVB, P interval 232 ms.    ASSESSMENT and PLAN  Eduardo SOFIA  "Angi is a 72 year old male scheduled to undergo DaVinci Assisted Radical Prostatectomy, Bilateral Pelvic Lymphadenectomy with Dr. Mathur on 5/19/2017 at [facility] under general anesthesia.      Cardiology - RCRI : No serious cardiac risks.  0.4 % risk of major adverse cardiac event. METS >4.  Patient quite anxious regarding his diagnosis,\"I have cancer\" with BP reflective of this.  Rechecked BP at end of visit and was still elevated, 170/94.  Instructed to contact PCP and have it further evaluated for medication management within the next few days.  Pulmonary - no smoking history  GI - s/p inguinal hernia repair with mesh, 2008.  Musculoskeletal - Recommend careful positioning given history of bilateral rotator cuff surgeries.   - prostatic adenocarcinoma, see HPI.  Describes what sounds like hypospadia, see HPI.  Neuro - Anxiety with recent diagnosis.  Instructed to follow-up with PCP and be evaluated for situational anxiety/depression.    He has the following specific operative considerations:   - Anesthesia considerations:  Refer to PAC assessment in anesthesia records  - VTE risk:  4.5%  - KERWIN # of risks 3/8 = intermediate  - Post-op delirium risk: elevated given age  - Risk of PONV score = 1.  If > 2, anti-emetic intervention recommended.    Arrival time, NPO, shower and medication instructions provided by nursing staff today.  Preparing For Your Surgery handout given.  Patient was discussed with Dr Zee. I spent 20 minutes face to face with patient, assessing, examining, and educating.          LEEROY Arellano CNP  Preoperative Assessment Center  [facility]  Clinic and [facility] Center  Phone: [phone]  Fax: [phone]  "

## 2017-05-08 ENCOUNTER — TELEPHONE (OUTPATIENT)
Dept: SURGERY | Facility: CLINIC | Age: 72
End: 2017-05-08

## 2017-05-08 LAB — INTERPRETATION ECG - MUSE: NORMAL

## 2017-05-08 NOTE — TELEPHONE ENCOUNTER
"Received message from coordinator to call Eduardo back re: \"look into getting on blood pressure meds. He was just calling back to discuss that with you.\"  Called patient back and left message to call us back at [phone].  Last week when I saw patient he was instructed to see his PCP for his blood pressure.  "

## 2017-05-08 NOTE — TELEPHONE ENCOUNTER
Returned Mr. Whitley's phone call.  He reports he was started on Losartan 50 mg one tablet by mouth daily.  Instructed to continue to take it, but to hold it the morning of surgery.  He verbalized an understanding.

## 2017-05-12 ENCOUNTER — TELEPHONE (OUTPATIENT)
Dept: UROLOGY | Facility: CLINIC | Age: 72
End: 2017-05-12

## 2017-05-12 NOTE — TELEPHONE ENCOUNTER
Eduardo Whitley contacted Urology triage today regarding EKG results  Patient's physician: Dr. Kevan Guevara, [facility]  05/12/17  9:27 AM    
normal...

## 2017-05-19 ENCOUNTER — HOSPITAL ENCOUNTER (INPATIENT)
Facility: CLINIC | Age: 72
LOS: 1 days | Discharge: HOME | DRG: 708 | End: 2017-05-20
Attending: UROLOGY | Admitting: UROLOGY
Payer: MEDICARE

## 2017-05-19 ENCOUNTER — ANESTHESIA (OUTPATIENT)
Dept: SURGERY | Facility: CLINIC | Age: 72
DRG: 708 | End: 2017-05-19
Payer: MEDICARE

## 2017-05-19 ENCOUNTER — SURGERY (OUTPATIENT)
Age: 72
End: 2017-05-19

## 2017-05-19 DIAGNOSIS — K59.03 DRUG-INDUCED CONSTIPATION: ICD-10-CM

## 2017-05-19 DIAGNOSIS — Z90.79 S/P PROSTATECTOMY: Primary | ICD-10-CM

## 2017-05-19 DIAGNOSIS — C61 MALIGNANT NEOPLASM OF PROSTATE (H): ICD-10-CM

## 2017-05-19 DIAGNOSIS — N32.89 BLADDER SPASM: ICD-10-CM

## 2017-05-19 LAB
ABO + RH BLD: NORMAL
ABO + RH BLD: NORMAL
ANION GAP SERPL CALCULATED.3IONS-SCNC: 9 MMOL/L (ref 3–14)
BLD GP AB SCN SERPL QL: NORMAL
BLOOD BANK CMNT PATIENT-IMP: NORMAL
BLOOD BANK CMNT PATIENT-IMP: NORMAL
BUN SERPL-MCNC: 20 MG/DL (ref 7–30)
CALCIUM SERPL-MCNC: 8.2 MG/DL (ref 8.5–10.1)
CHLORIDE SERPL-SCNC: 104 MMOL/L (ref 94–109)
CO2 SERPL-SCNC: 27 MMOL/L (ref 20–32)
CREAT SERPL-MCNC: 0.94 MG/DL (ref 0.66–1.25)
ERYTHROCYTE [DISTWIDTH] IN BLOOD BY AUTOMATED COUNT: 13.5 % (ref 10–15)
GFR SERPL CREATININE-BSD FRML MDRD: 78 ML/MIN/1.7M2
GLUCOSE SERPL-MCNC: 148 MG/DL (ref 70–99)
HCT VFR BLD AUTO: 39.3 % (ref 40–53)
HGB BLD-MCNC: 13.1 G/DL (ref 13.3–17.7)
MCH RBC QN AUTO: 30.3 PG (ref 26.5–33)
MCHC RBC AUTO-ENTMCNC: 33.3 G/DL (ref 31.5–36.5)
MCV RBC AUTO: 91 FL (ref 78–100)
PLATELET # BLD AUTO: 193 10E9/L (ref 150–450)
POTASSIUM SERPL-SCNC: 3.8 MMOL/L (ref 3.4–5.3)
RBC # BLD AUTO: 4.33 10E12/L (ref 4.4–5.9)
SODIUM SERPL-SCNC: 140 MMOL/L (ref 133–144)
SPECIMEN EXP DATE BLD: NORMAL
WBC # BLD AUTO: 14.5 10E9/L (ref 4–11)

## 2017-05-19 PROCEDURE — 37000008 ZZH ANESTHESIA TECHNICAL FEE, 1ST 30 MIN: Performed by: UROLOGY

## 2017-05-19 PROCEDURE — 36000086 ZZH SURGERY LEVEL 8 1ST 30 MIN UMMC: Performed by: UROLOGY

## 2017-05-19 PROCEDURE — 40000171 ZZH STATISTIC PRE-PROCEDURE ASSESSMENT III: Performed by: UROLOGY

## 2017-05-19 PROCEDURE — 25000128 H RX IP 250 OP 636: Performed by: STUDENT IN AN ORGANIZED HEALTH CARE EDUCATION/TRAINING PROGRAM

## 2017-05-19 PROCEDURE — 85027 COMPLETE CBC AUTOMATED: CPT | Performed by: UROLOGY

## 2017-05-19 PROCEDURE — 25000566 ZZH SEVOFLURANE, EA 15 MIN: Performed by: UROLOGY

## 2017-05-19 PROCEDURE — 71000014 ZZH RECOVERY PHASE 1 LEVEL 2 FIRST HR: Performed by: UROLOGY

## 2017-05-19 PROCEDURE — 0VT04ZZ RESECTION OF PROSTATE, PERCUTANEOUS ENDOSCOPIC APPROACH: ICD-10-PCS | Performed by: UROLOGY

## 2017-05-19 PROCEDURE — 12000001 ZZH R&B MED SURG/OB UMMC

## 2017-05-19 PROCEDURE — C9399 UNCLASSIFIED DRUGS OR BIOLOG: HCPCS | Performed by: NURSE ANESTHETIST, CERTIFIED REGISTERED

## 2017-05-19 PROCEDURE — 07TC4ZZ RESECTION OF PELVIS LYMPHATIC, PERCUTANEOUS ENDOSCOPIC APPROACH: ICD-10-PCS | Performed by: UROLOGY

## 2017-05-19 PROCEDURE — 25000128 H RX IP 250 OP 636: Performed by: NURSE ANESTHETIST, CERTIFIED REGISTERED

## 2017-05-19 PROCEDURE — 71000015 ZZH RECOVERY PHASE 1 LEVEL 2 EA ADDTL HR: Performed by: UROLOGY

## 2017-05-19 PROCEDURE — 25000128 H RX IP 250 OP 636: Performed by: UROLOGY

## 2017-05-19 PROCEDURE — A9270 NON-COVERED ITEM OR SERVICE: HCPCS | Mod: GY | Performed by: STUDENT IN AN ORGANIZED HEALTH CARE EDUCATION/TRAINING PROGRAM

## 2017-05-19 PROCEDURE — 8E0W4CZ ROBOTIC ASSISTED PROCEDURE OF TRUNK REGION, PERCUTANEOUS ENDOSCOPIC APPROACH: ICD-10-PCS | Performed by: UROLOGY

## 2017-05-19 PROCEDURE — 25000125 ZZHC RX 250: Performed by: NURSE ANESTHETIST, CERTIFIED REGISTERED

## 2017-05-19 PROCEDURE — 27210794 ZZH OR GENERAL SUPPLY STERILE: Performed by: UROLOGY

## 2017-05-19 PROCEDURE — 36000088 ZZH SURGERY LEVEL 8 EA 15 ADDTL MIN - UMMC: Performed by: UROLOGY

## 2017-05-19 PROCEDURE — 37000009 ZZH ANESTHESIA TECHNICAL FEE, EACH ADDTL 15 MIN: Performed by: UROLOGY

## 2017-05-19 PROCEDURE — 80048 BASIC METABOLIC PNL TOTAL CA: CPT | Performed by: UROLOGY

## 2017-05-19 PROCEDURE — 88307 TISSUE EXAM BY PATHOLOGIST: CPT | Performed by: UROLOGY

## 2017-05-19 PROCEDURE — 25000132 ZZH RX MED GY IP 250 OP 250 PS 637: Mod: GY | Performed by: STUDENT IN AN ORGANIZED HEALTH CARE EDUCATION/TRAINING PROGRAM

## 2017-05-19 PROCEDURE — 88309 TISSUE EXAM BY PATHOLOGIST: CPT | Performed by: UROLOGY

## 2017-05-19 PROCEDURE — P9041 ALBUMIN (HUMAN),5%, 50ML: HCPCS | Performed by: NURSE ANESTHETIST, CERTIFIED REGISTERED

## 2017-05-19 RX ORDER — ACETAMINOPHEN 325 MG/1
975 TABLET ORAL EVERY 8 HOURS
Status: DISCONTINUED | OUTPATIENT
Start: 2017-05-19 | End: 2017-05-20 | Stop reason: HOSPADM

## 2017-05-19 RX ORDER — HEPARIN SODIUM 5000 [USP'U]/.5ML
5000 INJECTION, SOLUTION INTRAVENOUS; SUBCUTANEOUS ONCE
Status: COMPLETED | OUTPATIENT
Start: 2017-05-19 | End: 2017-05-19

## 2017-05-19 RX ORDER — ONDANSETRON 2 MG/ML
4 INJECTION INTRAMUSCULAR; INTRAVENOUS EVERY 6 HOURS PRN
Status: DISCONTINUED | OUTPATIENT
Start: 2017-05-19 | End: 2017-05-20 | Stop reason: HOSPADM

## 2017-05-19 RX ORDER — ONDANSETRON 2 MG/ML
4 INJECTION INTRAMUSCULAR; INTRAVENOUS EVERY 30 MIN PRN
Status: DISCONTINUED | OUTPATIENT
Start: 2017-05-19 | End: 2017-05-19 | Stop reason: HOSPADM

## 2017-05-19 RX ORDER — SODIUM CHLORIDE, SODIUM LACTATE, POTASSIUM CHLORIDE, CALCIUM CHLORIDE 600; 310; 30; 20 MG/100ML; MG/100ML; MG/100ML; MG/100ML
INJECTION, SOLUTION INTRAVENOUS CONTINUOUS PRN
Status: DISCONTINUED | OUTPATIENT
Start: 2017-05-19 | End: 2017-05-19

## 2017-05-19 RX ORDER — ALBUMIN, HUMAN INJ 5% 5 %
SOLUTION INTRAVENOUS CONTINUOUS PRN
Status: DISCONTINUED | OUTPATIENT
Start: 2017-05-19 | End: 2017-05-19

## 2017-05-19 RX ORDER — CEFAZOLIN SODIUM 1 G/3ML
1 INJECTION, POWDER, FOR SOLUTION INTRAMUSCULAR; INTRAVENOUS SEE ADMIN INSTRUCTIONS
Status: DISCONTINUED | OUTPATIENT
Start: 2017-05-19 | End: 2017-05-19 | Stop reason: HOSPADM

## 2017-05-19 RX ORDER — DEXAMETHASONE SODIUM PHOSPHATE 4 MG/ML
INJECTION, SOLUTION INTRA-ARTICULAR; INTRALESIONAL; INTRAMUSCULAR; INTRAVENOUS; SOFT TISSUE PRN
Status: DISCONTINUED | OUTPATIENT
Start: 2017-05-19 | End: 2017-05-19

## 2017-05-19 RX ORDER — KETOROLAC TROMETHAMINE 30 MG/ML
INJECTION, SOLUTION INTRAMUSCULAR; INTRAVENOUS PRN
Status: DISCONTINUED | OUTPATIENT
Start: 2017-05-19 | End: 2017-05-19

## 2017-05-19 RX ORDER — EPHEDRINE SULFATE 50 MG/ML
INJECTION, SOLUTION INTRAMUSCULAR; INTRAVENOUS; SUBCUTANEOUS PRN
Status: DISCONTINUED | OUTPATIENT
Start: 2017-05-19 | End: 2017-05-19

## 2017-05-19 RX ORDER — ONDANSETRON 2 MG/ML
INJECTION INTRAMUSCULAR; INTRAVENOUS PRN
Status: DISCONTINUED | OUTPATIENT
Start: 2017-05-19 | End: 2017-05-19

## 2017-05-19 RX ORDER — NEOMYCIN/BACITRACIN/POLYMYXINB 3.5-400-5K
OINTMENT (GRAM) TOPICAL 2 TIMES DAILY
Status: DISCONTINUED | OUTPATIENT
Start: 2017-05-19 | End: 2017-05-20 | Stop reason: HOSPADM

## 2017-05-19 RX ORDER — HYDROMORPHONE HYDROCHLORIDE 1 MG/ML
.3-.5 INJECTION, SOLUTION INTRAMUSCULAR; INTRAVENOUS; SUBCUTANEOUS
Status: DISCONTINUED | OUTPATIENT
Start: 2017-05-19 | End: 2017-05-20 | Stop reason: HOSPADM

## 2017-05-19 RX ORDER — ONDANSETRON 4 MG/1
4 TABLET, ORALLY DISINTEGRATING ORAL EVERY 30 MIN PRN
Status: DISCONTINUED | OUTPATIENT
Start: 2017-05-19 | End: 2017-05-19 | Stop reason: HOSPADM

## 2017-05-19 RX ORDER — FENTANYL CITRATE 50 UG/ML
25-50 INJECTION, SOLUTION INTRAMUSCULAR; INTRAVENOUS
Status: DISCONTINUED | OUTPATIENT
Start: 2017-05-19 | End: 2017-05-19 | Stop reason: HOSPADM

## 2017-05-19 RX ORDER — HYDROMORPHONE HYDROCHLORIDE 1 MG/ML
.3-.5 INJECTION, SOLUTION INTRAMUSCULAR; INTRAVENOUS; SUBCUTANEOUS EVERY 5 MIN PRN
Status: DISCONTINUED | OUTPATIENT
Start: 2017-05-19 | End: 2017-05-19 | Stop reason: HOSPADM

## 2017-05-19 RX ORDER — SODIUM CHLORIDE, SODIUM LACTATE, POTASSIUM CHLORIDE, CALCIUM CHLORIDE 600; 310; 30; 20 MG/100ML; MG/100ML; MG/100ML; MG/100ML
INJECTION, SOLUTION INTRAVENOUS CONTINUOUS
Status: DISCONTINUED | OUTPATIENT
Start: 2017-05-19 | End: 2017-05-19 | Stop reason: HOSPADM

## 2017-05-19 RX ORDER — LIDOCAINE 40 MG/G
CREAM TOPICAL
Status: DISCONTINUED | OUTPATIENT
Start: 2017-05-19 | End: 2017-05-20 | Stop reason: HOSPADM

## 2017-05-19 RX ORDER — ONDANSETRON 4 MG/1
4 TABLET, ORALLY DISINTEGRATING ORAL EVERY 6 HOURS PRN
Status: DISCONTINUED | OUTPATIENT
Start: 2017-05-19 | End: 2017-05-20 | Stop reason: HOSPADM

## 2017-05-19 RX ORDER — LABETALOL HYDROCHLORIDE 5 MG/ML
5-10 INJECTION, SOLUTION INTRAVENOUS EVERY 10 MIN PRN
Status: DISCONTINUED | OUTPATIENT
Start: 2017-05-19 | End: 2017-05-19 | Stop reason: HOSPADM

## 2017-05-19 RX ORDER — LABETALOL HYDROCHLORIDE 5 MG/ML
INJECTION, SOLUTION INTRAVENOUS PRN
Status: DISCONTINUED | OUTPATIENT
Start: 2017-05-19 | End: 2017-05-19

## 2017-05-19 RX ORDER — OXYCODONE HYDROCHLORIDE 5 MG/1
5 TABLET ORAL
Status: DISCONTINUED | OUTPATIENT
Start: 2017-05-19 | End: 2017-05-20 | Stop reason: HOSPADM

## 2017-05-19 RX ORDER — SODIUM CHLORIDE 9 MG/ML
INJECTION, SOLUTION INTRAVENOUS CONTINUOUS
Status: DISCONTINUED | OUTPATIENT
Start: 2017-05-19 | End: 2017-05-20

## 2017-05-19 RX ORDER — AMOXICILLIN 250 MG
1-2 CAPSULE ORAL 2 TIMES DAILY
Status: DISCONTINUED | OUTPATIENT
Start: 2017-05-19 | End: 2017-05-20 | Stop reason: HOSPADM

## 2017-05-19 RX ORDER — CEFAZOLIN SODIUM 2 G/100ML
2 INJECTION, SOLUTION INTRAVENOUS
Status: COMPLETED | OUTPATIENT
Start: 2017-05-19 | End: 2017-05-19

## 2017-05-19 RX ORDER — LIDOCAINE HYDROCHLORIDE 20 MG/ML
INJECTION, SOLUTION INFILTRATION; PERINEURAL PRN
Status: DISCONTINUED | OUTPATIENT
Start: 2017-05-19 | End: 2017-05-19

## 2017-05-19 RX ORDER — ACETAMINOPHEN 10 MG/ML
1000 INJECTION, SOLUTION INTRAVENOUS ONCE
Status: COMPLETED | OUTPATIENT
Start: 2017-05-19 | End: 2017-05-19

## 2017-05-19 RX ORDER — FENTANYL CITRATE 50 UG/ML
INJECTION, SOLUTION INTRAMUSCULAR; INTRAVENOUS PRN
Status: DISCONTINUED | OUTPATIENT
Start: 2017-05-19 | End: 2017-05-19

## 2017-05-19 RX ORDER — NEOMYCIN/BACITRACIN/POLYMYXINB 3.5-400-5K
OINTMENT (GRAM) TOPICAL
Status: DISCONTINUED | OUTPATIENT
Start: 2017-05-19 | End: 2017-05-20 | Stop reason: HOSPADM

## 2017-05-19 RX ORDER — NALOXONE HYDROCHLORIDE 0.4 MG/ML
.1-.4 INJECTION, SOLUTION INTRAMUSCULAR; INTRAVENOUS; SUBCUTANEOUS
Status: DISCONTINUED | OUTPATIENT
Start: 2017-05-19 | End: 2017-05-20 | Stop reason: HOSPADM

## 2017-05-19 RX ORDER — PROPOFOL 10 MG/ML
INJECTION, EMULSION INTRAVENOUS PRN
Status: DISCONTINUED | OUTPATIENT
Start: 2017-05-19 | End: 2017-05-19

## 2017-05-19 RX ADMIN — HYDROMORPHONE HYDROCHLORIDE 0.3 MG: 1 INJECTION, SOLUTION INTRAMUSCULAR; INTRAVENOUS; SUBCUTANEOUS at 21:35

## 2017-05-19 RX ADMIN — LABETALOL HYDROCHLORIDE 5 MG: 5 INJECTION, SOLUTION INTRAVENOUS at 08:38

## 2017-05-19 RX ADMIN — CEFAZOLIN SODIUM 1 G: 2 INJECTION, SOLUTION INTRAVENOUS at 10:05

## 2017-05-19 RX ADMIN — ROCURONIUM BROMIDE 20 MG: 10 INJECTION INTRAVENOUS at 08:20

## 2017-05-19 RX ADMIN — PHENYLEPHRINE HYDROCHLORIDE 100 MCG: 10 INJECTION, SOLUTION INTRAMUSCULAR; INTRAVENOUS; SUBCUTANEOUS at 12:01

## 2017-05-19 RX ADMIN — CEFAZOLIN SODIUM 1 G: 2 INJECTION, SOLUTION INTRAVENOUS at 11:58

## 2017-05-19 RX ADMIN — ROCURONIUM BROMIDE 30 MG: 10 INJECTION INTRAVENOUS at 08:14

## 2017-05-19 RX ADMIN — Medication 5 MG: at 09:42

## 2017-05-19 RX ADMIN — PHENYLEPHRINE HYDROCHLORIDE 100 MCG: 10 INJECTION, SOLUTION INTRAMUSCULAR; INTRAVENOUS; SUBCUTANEOUS at 07:58

## 2017-05-19 RX ADMIN — ALBUMIN (HUMAN): 12.5 SOLUTION INTRAVENOUS at 08:08

## 2017-05-19 RX ADMIN — PHENYLEPHRINE HYDROCHLORIDE 100 MCG: 10 INJECTION, SOLUTION INTRAMUSCULAR; INTRAVENOUS; SUBCUTANEOUS at 10:49

## 2017-05-19 RX ADMIN — SENNOSIDES AND DOCUSATE SODIUM 1 TABLET: 8.6; 5 TABLET ORAL at 19:41

## 2017-05-19 RX ADMIN — SUGAMMADEX 130 MG: 100 INJECTION, SOLUTION INTRAVENOUS at 12:19

## 2017-05-19 RX ADMIN — PHENYLEPHRINE HYDROCHLORIDE 50 MCG: 10 INJECTION, SOLUTION INTRAMUSCULAR; INTRAVENOUS; SUBCUTANEOUS at 09:45

## 2017-05-19 RX ADMIN — SODIUM CHLORIDE, POTASSIUM CHLORIDE, SODIUM LACTATE AND CALCIUM CHLORIDE: 600; 310; 30; 20 INJECTION, SOLUTION INTRAVENOUS at 12:00

## 2017-05-19 RX ADMIN — PHENYLEPHRINE HYDROCHLORIDE 100 MCG: 10 INJECTION, SOLUTION INTRAMUSCULAR; INTRAVENOUS; SUBCUTANEOUS at 08:10

## 2017-05-19 RX ADMIN — CEFAZOLIN SODIUM 2 G: 2 INJECTION, SOLUTION INTRAVENOUS at 08:10

## 2017-05-19 RX ADMIN — SODIUM CHLORIDE, POTASSIUM CHLORIDE, SODIUM LACTATE AND CALCIUM CHLORIDE: 600; 310; 30; 20 INJECTION, SOLUTION INTRAVENOUS at 07:29

## 2017-05-19 RX ADMIN — HYDROMORPHONE HYDROCHLORIDE 0.5 MG: 1 INJECTION, SOLUTION INTRAMUSCULAR; INTRAVENOUS; SUBCUTANEOUS at 08:30

## 2017-05-19 RX ADMIN — Medication 5 MG: at 10:49

## 2017-05-19 RX ADMIN — SODIUM CHLORIDE, POTASSIUM CHLORIDE, SODIUM LACTATE AND CALCIUM CHLORIDE: 600; 310; 30; 20 INJECTION, SOLUTION INTRAVENOUS at 11:45

## 2017-05-19 RX ADMIN — LIDOCAINE HYDROCHLORIDE 100 MG: 20 INJECTION, SOLUTION INFILTRATION; PERINEURAL at 07:47

## 2017-05-19 RX ADMIN — Medication 5 MG: at 11:48

## 2017-05-19 RX ADMIN — ROCURONIUM BROMIDE 50 MG: 10 INJECTION INTRAVENOUS at 07:48

## 2017-05-19 RX ADMIN — Medication 5 MG: at 08:10

## 2017-05-19 RX ADMIN — FENTANYL CITRATE 50 MCG: 50 INJECTION, SOLUTION INTRAMUSCULAR; INTRAVENOUS at 08:23

## 2017-05-19 RX ADMIN — HEPARIN SODIUM 5000 UNITS: 5000 INJECTION, SOLUTION INTRAVENOUS; SUBCUTANEOUS at 06:50

## 2017-05-19 RX ADMIN — HYDROMORPHONE HYDROCHLORIDE 0.5 MG: 1 INJECTION, SOLUTION INTRAMUSCULAR; INTRAVENOUS; SUBCUTANEOUS at 12:14

## 2017-05-19 RX ADMIN — SODIUM CHLORIDE: 9 INJECTION, SOLUTION INTRAVENOUS at 22:46

## 2017-05-19 RX ADMIN — MIDAZOLAM HYDROCHLORIDE 2 MG: 1 INJECTION, SOLUTION INTRAMUSCULAR; INTRAVENOUS at 07:29

## 2017-05-19 RX ADMIN — FENTANYL CITRATE 150 MCG: 50 INJECTION, SOLUTION INTRAMUSCULAR; INTRAVENOUS at 07:46

## 2017-05-19 RX ADMIN — ROCURONIUM BROMIDE 10 MG: 10 INJECTION INTRAVENOUS at 11:15

## 2017-05-19 RX ADMIN — ROCURONIUM BROMIDE 10 MG: 10 INJECTION INTRAVENOUS at 10:20

## 2017-05-19 RX ADMIN — DEXAMETHASONE SODIUM PHOSPHATE 6 MG: 4 INJECTION, SOLUTION INTRA-ARTICULAR; INTRALESIONAL; INTRAMUSCULAR; INTRAVENOUS; SOFT TISSUE at 08:07

## 2017-05-19 RX ADMIN — BACITRACIN, NEOMYCIN, POLYMYXIN B: 400; 3.5; 5 OINTMENT TOPICAL at 22:45

## 2017-05-19 RX ADMIN — PHENYLEPHRINE HYDROCHLORIDE 100 MCG: 10 INJECTION, SOLUTION INTRAMUSCULAR; INTRAVENOUS; SUBCUTANEOUS at 12:03

## 2017-05-19 RX ADMIN — ROCURONIUM BROMIDE 10 MG: 10 INJECTION INTRAVENOUS at 11:45

## 2017-05-19 RX ADMIN — Medication 5 MG: at 08:05

## 2017-05-19 RX ADMIN — PROPOFOL 100 MG: 10 INJECTION, EMULSION INTRAVENOUS at 07:47

## 2017-05-19 RX ADMIN — FENTANYL CITRATE 50 MCG: 50 INJECTION, SOLUTION INTRAMUSCULAR; INTRAVENOUS at 08:32

## 2017-05-19 RX ADMIN — Medication 5 MG: at 12:01

## 2017-05-19 RX ADMIN — ACETAMINOPHEN 975 MG: 325 TABLET, FILM COATED ORAL at 19:41

## 2017-05-19 RX ADMIN — HYDROMORPHONE HYDROCHLORIDE 0.3 MG: 1 INJECTION, SOLUTION INTRAMUSCULAR; INTRAVENOUS; SUBCUTANEOUS at 17:19

## 2017-05-19 RX ADMIN — ONDANSETRON 4 MG: 2 INJECTION INTRAMUSCULAR; INTRAVENOUS at 11:57

## 2017-05-19 RX ADMIN — Medication 5 MG: at 07:58

## 2017-05-19 RX ADMIN — PHENYLEPHRINE HYDROCHLORIDE 50 MCG: 10 INJECTION, SOLUTION INTRAMUSCULAR; INTRAVENOUS; SUBCUTANEOUS at 11:45

## 2017-05-19 RX ADMIN — ACETAMINOPHEN 1000 MG: 10 INJECTION, SOLUTION INTRAVENOUS at 11:57

## 2017-05-19 RX ADMIN — ROCURONIUM BROMIDE 10 MG: 10 INJECTION INTRAVENOUS at 09:17

## 2017-05-19 RX ADMIN — KETOROLAC TROMETHAMINE 15 MG: 30 INJECTION, SOLUTION INTRAMUSCULAR at 12:07

## 2017-05-19 ASSESSMENT — ACTIVITIES OF DAILY LIVING (ADL)
BATHING: 0-->INDEPENDENT
AMBULATION: 0-->INDEPENDENT
TOILETING: 0-->INDEPENDENT
RETIRED_COMMUNICATION: 0-->UNDERSTANDS/COMMUNICATES WITHOUT DIFFICULTY
RETIRED_EATING: 0-->INDEPENDENT
SWALLOWING: 0-->SWALLOWS FOODS/LIQUIDS WITHOUT DIFFICULTY
FALL_HISTORY_WITHIN_LAST_SIX_MONTHS: NO
DRESS: 0-->INDEPENDENT
COGNITION: 0 - NO COGNITION ISSUES REPORTED
TRANSFERRING: 0-->INDEPENDENT

## 2017-05-19 NOTE — BRIEF OP NOTE
[facility], [place]    Brief Operative Note    Pre-operative diagnosis: Malignant Neoplasm Of Prostate  Post-operative diagnosis Same  Procedure: Procedure(s):  DaVinci Assisted Radical Prostatectomy, Bilateral Pelvic Lymphadenectomy - Wound Class: II-Clean Contaminated  Surgeon: Surgeon(s) and Role:     * Everton Mathur MD - Primary     * Dipti Nolan MD - Resident - Assisting     * Jayy Pratt MD - Resident - Assisting  Anesthesia: General   Estimated blood loss: 50 mL  Drains: 18 Trinidadian Long catheter, 19 Trinidadian Merritt drain   Specimens:   ID Type Source Tests Collected by Time Destination   1 : Abdominal Hair  Other (specify in comments) Other OR DOCUMENTATION ONLY Everton Mathur MD 5/19/2017  9:53 AM    2 : Head Hair  Other (specify in comments) Other OR DOCUMENTATION ONLY Everton Mathur MD 5/19/2017  9:53 AM    A : Left Pelvic Lymph Nodes  Tissue Lymph Node, Left Pelvic SURGICAL PATHOLOGY EXAM Everton Mathur MD 5/19/2017 10:05 AM    B : Right Pelvic Lymph Nodes  Tissue Lymph Node, Right Pelvic SURGICAL PATHOLOGY EXAM Everton Mathur MD 5/19/2017 10:25 AM    C : Prostate and Seminal Vesicles  Tissue Prostate SURGICAL PATHOLOGY EXAM Everton Mathur MD 5/19/2017 12:01 PM      Findings:   Watertight vesicourethral anastomosis at the case conclusion. See dictated report for additional details.   Complications: None  Dispo: Stable to PACU

## 2017-05-19 NOTE — ANESTHESIA CARE TRANSFER NOTE
Patient: Eduardo Whitley    Procedure(s):  DaVinci Assisted Radical Prostatectomy, Bilateral Pelvic Lymphadenectomy - Wound Class: II-Clean Contaminated    Diagnosis: Malignant Neoplasm Of Prostate  Diagnosis Additional Information: No value filed.    Anesthesia Type:   General, ETT     Note:  Airway :Face Mask  Patient transferred to:PACU  Comments: VSS.  Report given to RN.      Vitals: (Last set prior to Anesthesia Care Transfer)    CRNA VITALS  5/19/2017 1211 - 5/19/2017 1249      5/19/2017             Pulse: 76    SpO2: (!)  85 %    Resp Rate (set): 10                Electronically Signed By: LEEROY Clinton CRNA  May 19, 2017  12:49 PM

## 2017-05-19 NOTE — PROGRESS NOTES
Vitals:    05/19/17 1400 05/19/17 1430 05/19/17 1447 05/19/17 1512   BP: 116/67 117/60 128/63 113/59   BP Location:  Left arm Left arm Left arm   Resp: 16 15 18 18   Temp: 97  F (36.1  C) 96.8  F (36  C) 97.9  F (36.6  C) 97.4  F (36.3  C)   TempSrc: Temporal Oral Axillary Axillary   SpO2: 99% 98% 97% 98%   Weight:       Height:       Post OP   PT  Arrived from PACU 2 at pm alert and oriented. Denied pain some discomfort. Ambulated from the stretcher to bed did well. Pain med offer pt declined.  Long patent fair amount. NATALIIA patent and had small amount.  abd incisions clear dry and intact. Continue to follow up per plan of care.

## 2017-05-19 NOTE — ANESTHESIA POSTPROCEDURE EVALUATION
Patient: Eduardo Whitley    Procedure(s):  DaVinci Assisted Radical Prostatectomy, Bilateral Pelvic Lymphadenectomy - Wound Class: II-Clean Contaminated    Diagnosis:Malignant Neoplasm Of Prostate  Diagnosis Additional Information: No value filed.    Anesthesia Type:  General, ETT    Note:  Anesthesia Post Evaluation    Patient location during evaluation: PACU  Patient participation: Able to fully participate in evaluation  Level of consciousness: awake and alert  Pain management: adequate  Airway patency: patent  Cardiovascular status: hemodynamically stable  Respiratory status: acceptable and spontaneous ventilation  Hydration status: acceptable  PONV: none     Anesthetic complications: None          Last vitals:  Vitals:    05/19/17 1330 05/19/17 1345 05/19/17 1400   BP: 121/66 121/66 116/67   Resp: 14 14 16   Temp:  35.9  C (96.7  F) 36.1  C (97  F)   SpO2: 99% 98% 99%         Electronically Signed By: Jeremy Pruett MD  May 19, 2017  2:17 PM

## 2017-05-19 NOTE — OP NOTE
PREPROCEDURE DIAGNOSIS: Prostate cancer.   POSTPROCEDURE DIAGNOSIS: Prostate cancer.   PROCEDURES PERFORMED: Robotic radical prostatectomy, bilateral pelvic lymph node dissection.   SURGEON: Everton Mathur MD   ASSISTANT: Payton Nolan MD   SECOND ASSISTANT: Jayy Rust MD   ANESTHESIA: General with endotracheal intubation.   INDICATIONS FOR PROCEDURE: Mr. Whitley is a 72year-old gentleman with a history of  Nabeel 7 prostate cancer. He has been counseled regarding treatment options and presents to undergo surgery.   Findings:  Bilateral nerve sparing.  Partial nerve sparing on left  Specimens:  Prostate, seminal vesicles, lymph nodes  DESCRIPTION OF PROCEDURE: After fully informed voluntary consent was obtained, the patient was brought into the operating room, properly identified and placed in a dorsal lithotomy position on the table. General anesthesia was induced, endotracheal intubation performed, IV Ancef administered. The patient was then positioned appropriately on the table and all pressure points were padded and he was secured to the table with tape. Once the positioning was performed, we proceeded with shaving, prepping and draping the abdomen in the usual sterile fashion with Betadine. Long was placed in the bladder in a sterile manner on the field. A midline incision about 18 cm above the pubic symphysis was made and a Veress needle introduced into the abdomen through this incision. Once the abdomen was accessed, it was confirmed using a saline drop test and the abdomen was insufflated. Once the abdomen was completely insufflated, additional robotic ports, 2 on the left and 1 on the right, were placed and a right-sided assistant port between the right-sided robot port and main camera were placed. Robot was then docked and surgery was commenced. The sigmoid was adherent to the lateral pelvic sidewall and these adhesions were taken down sharply. We then made a transverse incision posterior to  the seminal vesicles and mobilized the rectum away from the posterior aspect of the prostate. The vas deferens on either side were identified and divided with electrocautery and the seminal vesicles were carefully dissected out with the vasculature being controlled using Lapro clips. We mobilized the right neurovascular bundle off the posterolateral aspect of the prostate from behind till the endopelvic fascia was visible.  The NVB did not come off as easily so we stopped the dissection from the posterior approach in order to avoid the risk of positive margin.  Once seminal vesicle and vas deferens were dissected all the way down to the prostate, attention was turned to bringing the bladder down off the anterior wall of the abdomen. Incisions were made parallel to the urachus on either side and carried all the way up to the umbilicus.  The urachus was left intact and not divided.  We then dropped down the bladder from the anterior abdominal wall and the rest of the bladder was mobilized away from the pelvic sidewalls on both sides. We then performed a pelvic lymph node dissection removing all lucio tissue between the external iliac vein, the pelvic floor, inguinal ligament up to the bifurcation of the common iliac vessels on both sides. We then transversely incised the anterior bladder neck at the base of the prostate until we were able to incise the anterior urethra and identify the Long catheter.  We were also able to very easily separate the right NVB off the prostate at this point as most of it had been dissected out from the posterior approach.  The Long was then pulled out through this opening in the urethra to give upward traction on the prostate. The left NVB was then  off the laterl aspect of the prostate leaving a small amount of the bundle adherent to the prostate to avoid a positive margin.  Posterior bladder neck was then divided.  The apical portion of the neurovascular bundles were then   off on both sides. We then divided the dorsal venous complex and urethra distal to the prostatic apex after carefully dissecting out the apex and taking care to ensure there was no capsular incision. Once the prostate was completely resected, it was set aside in the left pericolic gutter. We then carefully inspected for hemostasis.  The dorsal vein complex was oversewn using a 3.0 Quill suture.   We then completed our vesicourethral anastomosis in a standard Van Velthoven fashion using two 3-0 Monocryl sutures, the tail ends of which had been tied together. The first suture was taken at the 5 o'clock position through the bladder neck and taken through the posterior urethral plate and up the left side of the urethra and bladder to the 11 o'clock position. We then brought the right sided anastomotic suture through the urethra and up the right side of the urethra and bladder to the midline. The sutures were then tied down. A final Long catheter was then placed and the bladder irrigated.  We tested the bladder by instilling 120cc of saline and found it to be leak free.   An EndoCatch bag was used to retrieve the specimen and a 19 Merritt drain was placed in the pelvis. We then undocked the robot and removed all the ports. Midline camera port incision was enlarged and the specimen retrieved through this incision. The fascia was reapproximated using interrupted figure-of-eight 0 Vicryl sutures on UR-6 needle. The skin of all the incisions was closed using running subcuticular closure with 3-0 Monocryl. The patient tolerated the procedure well. There were no complications. Blood loss was 50 mL. All sponge, needle and instrument counts were correct and doubly verified prior to closure. I was present and involved in the entire procedure.   SUZI KIM MD

## 2017-05-19 NOTE — IP AVS SNAPSHOT
[address] [address]    Phone:  [phone]                                       After Visit Summary   5/19/2017    Eduardo Whitley    MRN: [id number]           After Visit Summary Signature Page     I have received my discharge instructions, and my questions have been answered. I have discussed any challenges I see with this plan with the nurse or doctor.    ..........................................................................................................................................  Patient/Patient Representative Signature      ..........................................................................................................................................  Patient Representative Print Name and Relationship to Patient    ..................................................               ................................................  Date                                            Time    ..........................................................................................................................................  Reviewed by Signature/Title    ...................................................              ..............................................  Date                                                            Time

## 2017-05-19 NOTE — IP AVS SNAPSHOT
MRN:[id number]                      After Visit Summary   5/19/2017    Eduardo Whtiley    MRN: [id number]           Thank you!     Thank you for choosing [facility] for your care. Our goal is always to provide you with excellent care. Hearing back from our patients is one way we can continue to improve our services. Please take a few minutes to complete the written survey that you may receive in the mail after you visit with us. Thank you!        Patient Information     Date Of Birth          1945        Designated Caregiver       Most Recent Value    Caregiver    Will someone help with your care after discharge? yes    Name of designated caregiver wife/ Marga    Phone number of caregiver [phone]    Caregiver address same address       About your hospital stay     You were admitted on:  May 19, 2017 You last received care in the:  [address] [facility] [facility]    You were discharged on:  May 20, 2017        Reason for your hospital stay       Robotic prostatectomy                  Who to Call     For medical emergencies, please call 911.  For non-urgent questions about your medical care, please call your primary care provider or clinic, [phone]  For questions related to your surgery, please call your surgery clinic        Attending Provider     Provider Everton Riley MD Urology       Primary Care Provider Office Phone # Fax #    [facility] [phone]       [address] [place] [address]         When to contact your care team       See DC instructions                  After Care Instructions     Activity       See DC instructions            Diet       See DC instructions            Discharge Instructions       Activity  - No strenuous exercise for 4 weeks.  - No lifting, pushing, pulling more than 10 pounds for 4 weeks.   - Do not strain with bowel movements.  - Do not drive until you can press the brake pedal quickly and fully without  "pain.   - Do not operate a motor vehicle while taking narcotic pain medications.     Urination    - You are going home with the following tubes or drains: durán catheter.  Nurse/ to write care and instructions.  Treat the catheter like an extension of your body; do not let it get caught or snagged on anything.  Leave the catheter in place until your follow up. Call the numbers listed above for any concerns.   - Catheter to be removed in clinic at time of follow up    Medications  - Transition from narcotic pain medications to tylenol (acetaminophen) as you are able.  Wean yourself off all pain medications as you are able.  - Some pain medications contain both tylenol (acetaminophen) and a narcotic (Norco, vicodin, percocet), do not take more than 4,000mg of Tylenol (acetaminophen) from all sources in any 24 hour period.  - Narcotics can make you constipated.  Take over the counter fiber (metamucil or benefiber) and stool softeners (miralax, docusate or senna) while taking narcotic pain medications, but stop if you develop diarrhea.  - No driving or operating machinery while taking narcotic pain medications     Follow-Up:  - Call your primary care provider to touch base regarding your recent admission.    - Call or return sooner than your regularly scheduled visit if you develop any of the following: fever (greater than 101.5), uncontrolled pain, uncontrolled nausea or vomiting, as well as increased redness, swelling, or drainage from your wound.     Phone numbers:   - Monday through Friday 8am to 4:30pm: Call [phone] with questions or to schedule or confirm appointment.    - Nights or weekends: call the after hours emergency pager - [phone] and tell the  \"I would like to page the Urology Resident on call.\"  - For emergencies, call 911            IV access       See DC instructions            Monitor and record       See DC instructions            Supplies       Leg bag for durán         "    Tubes and drains       See DC instructions            Wound care and dressings       See DC instructions                  Follow-up Appointments     Adult [facility] Follow-up and recommended labs and tests       Follow up in clinic as previously scheduled    Appointments on [place] and/or [place] campus (with [facility] or [facility] provider or service). Call [phone] if you haven't heard regarding these appointments within 7 days of discharge.                  Your next 10 appointments already scheduled     May 31, 2017  2:30 PM CDT   XR CYSTOGRAM with UCXR2, UC GIGU RAD, UC IMAGING NURSE   [facility] [place] [facility] ([facility])    [address]  [address] [facility] [address] [phone]           Please bring a list of your current medicines to your exam. (Include vitamins, minerals and over-thecounter medicines.) Leave your valuables at home.  Tell your doctor if there is a chance you may be pregnant.  You do not need to do anything special for this exam.            May 31, 2017  3:30 PM CDT   (Arrive by 3:15 PM)   Post-Op with Aidan Biggs MD   [facility] for Prostate and Urologic Cancers ([facility])    [address] [place] [address]  [address] [facility] [address] [phone]              Pending Results     Date and Time Order Name Status Description    5/19/2017 1005 Surgical pathology exam In process             Statement of Approval     Ordered          05/20/17 0934  I have reviewed and agree with all the recommendations and orders detailed in this document.  EFFECTIVE NOW     Approved and electronically signed by:  Jayy Pratt MD             Admission Information     Date & Time Provider Department Dept. Phone    5/19/2017 Everton Mathur MD [address] [facility] [place] [phone]      Your Vitals Were     Blood Pressure Temperature Respirations Height Weight Pulse Oximetry    137/68 (BP Location: Left  "arm) 97.7  F (36.5  C) (Oral) 16 1.676 m (5' 6\") 66.1 kg (145 lb 11.6 oz) 100%    BMI (Body Mass Index)                   23.52 kg/m2           SmartNews Information     [employer] gives you secure access to your electronic health record. If you see a primary care provider, you can also send messages to your care team and make appointments. If you have questions, please call your primary care clinic.  If you do not have a primary care provider, please call [phone] and they will assist you.        Care EveryWhere ID     This is your Care EveryWhere ID. This could be used by other organizations to access your [facility] medical records  [id number]           Review of your medicines      START taking        Dose / Directions    acetaminophen 325 MG tablet   Commonly known as:  TYLENOL        Dose:  650 mg   Take 2 tablets (650 mg) by mouth every 4 hours as needed for mild pain   Quantity:  100 tablet   Refills:  0       oxyCODONE 5 MG IR tablet   Commonly known as:  ROXICODONE        Dose:  5 mg   Take 1 tablet (5 mg) by mouth every 4 hours as needed for moderate to severe pain   Quantity:  30 tablet   Refills:  0       senna-docusate 8.6-50 MG per tablet   Commonly known as:  SENOKOT-S;PERICOLACE   Used for:  Drug-induced constipation        Dose:  1-2 tablet   Take 1-2 tablets by mouth 2 times daily   Quantity:  40 tablet   Refills:  0       tolterodine 2 MG 24 hr capsule   Commonly known as:  DETROL LA   Used for:  Bladder spasm        Dose:  2 mg   Take 1 capsule (2 mg) by mouth daily as needed   Quantity:  12 capsule   Refills:  0         CONTINUE these medicines which have NOT CHANGED        Dose / Directions    LOSARTAN POTASSIUM PO        Dose:  50 mg   Take 50 mg by mouth daily   Refills:  0       Multiple vitamin Tabs        Dose:  1 tablet   Take 1 tablet by mouth every evening   Refills:  0         STOP taking     Saw [place] 160 MG Caps                Where to get your medicines      These medications " were sent to [facility] - [place], [place] - [address]  [address] [address]     Phone:  [phone]     acetaminophen 325 MG tablet    senna-docusate 8.6-50 MG per tablet    tolterodine 2 MG 24 hr capsule         Some of these will need a paper prescription and others can be bought over the counter. Ask your nurse if you have questions.     Bring a paper prescription for each of these medications     oxyCODONE 5 MG IR tablet                Protect others around you: Learn how to safely use, store and throw away your medicines at www.disposemymeds.org.             Medication List: This is a list of all your medications and when to take them. Check marks below indicate your daily home schedule. Keep this list as a reference.      Medications           Morning Afternoon Evening Bedtime As Needed    acetaminophen 325 MG tablet   Commonly known as:  TYLENOL   Take 2 tablets (650 mg) by mouth every 4 hours as needed for mild pain   Last time this was given:  975 mg on 5/20/2017  5:00 AM                                LOSARTAN POTASSIUM PO   Take 50 mg by mouth daily                                Multiple vitamin Tabs   Take 1 tablet by mouth every evening                                oxyCODONE 5 MG IR tablet   Commonly known as:  ROXICODONE   Take 1 tablet (5 mg) by mouth every 4 hours as needed for moderate to severe pain                                senna-docusate 8.6-50 MG per tablet   Commonly known as:  SENOKOT-S;PERICOLACE   Take 1-2 tablets by mouth 2 times daily   Last time this was given:  2 tablets on 5/20/2017  7:45 AM                                tolterodine 2 MG 24 hr capsule   Commonly known as:  DETROL LA   Take 1 capsule (2 mg) by mouth daily as needed

## 2017-05-20 VITALS
TEMPERATURE: 98.6 F | HEIGHT: 66 IN | SYSTOLIC BLOOD PRESSURE: 133 MMHG | WEIGHT: 145.72 LBS | BODY MASS INDEX: 23.42 KG/M2 | DIASTOLIC BLOOD PRESSURE: 80 MMHG | RESPIRATION RATE: 16 BRPM | OXYGEN SATURATION: 97 %

## 2017-05-20 LAB
ANION GAP SERPL CALCULATED.3IONS-SCNC: 8 MMOL/L (ref 3–14)
BUN SERPL-MCNC: 16 MG/DL (ref 7–30)
CALCIUM SERPL-MCNC: 7.8 MG/DL (ref 8.5–10.1)
CHLORIDE SERPL-SCNC: 110 MMOL/L (ref 94–109)
CO2 SERPL-SCNC: 25 MMOL/L (ref 20–32)
CREAT FLD-MCNC: 1 MG/DL
CREAT FLD-MCNC: 1 MG/DL
CREAT SERPL-MCNC: 0.95 MG/DL (ref 0.66–1.25)
ERYTHROCYTE [DISTWIDTH] IN BLOOD BY AUTOMATED COUNT: 13.9 % (ref 10–15)
GFR SERPL CREATININE-BSD FRML MDRD: 78 ML/MIN/1.7M2
GLUCOSE SERPL-MCNC: 98 MG/DL (ref 70–99)
HCT VFR BLD AUTO: 33.6 % (ref 40–53)
HGB BLD-MCNC: 11.1 G/DL (ref 13.3–17.7)
MCH RBC QN AUTO: 30.2 PG (ref 26.5–33)
MCHC RBC AUTO-ENTMCNC: 33 G/DL (ref 31.5–36.5)
MCV RBC AUTO: 91 FL (ref 78–100)
PLATELET # BLD AUTO: 175 10E9/L (ref 150–450)
POTASSIUM SERPL-SCNC: 3.8 MMOL/L (ref 3.4–5.3)
RBC # BLD AUTO: 3.68 10E12/L (ref 4.4–5.9)
SODIUM SERPL-SCNC: 143 MMOL/L (ref 133–144)
SPECIMEN SOURCE FLD: NORMAL
SPECIMEN SOURCE FLD: NORMAL
WBC # BLD AUTO: 9 10E9/L (ref 4–11)

## 2017-05-20 PROCEDURE — A9270 NON-COVERED ITEM OR SERVICE: HCPCS | Mod: GY | Performed by: STUDENT IN AN ORGANIZED HEALTH CARE EDUCATION/TRAINING PROGRAM

## 2017-05-20 PROCEDURE — 80048 BASIC METABOLIC PNL TOTAL CA: CPT | Performed by: STUDENT IN AN ORGANIZED HEALTH CARE EDUCATION/TRAINING PROGRAM

## 2017-05-20 PROCEDURE — 82570 ASSAY OF URINE CREATININE: CPT | Performed by: STUDENT IN AN ORGANIZED HEALTH CARE EDUCATION/TRAINING PROGRAM

## 2017-05-20 PROCEDURE — 85027 COMPLETE CBC AUTOMATED: CPT | Performed by: STUDENT IN AN ORGANIZED HEALTH CARE EDUCATION/TRAINING PROGRAM

## 2017-05-20 PROCEDURE — 36415 COLL VENOUS BLD VENIPUNCTURE: CPT | Performed by: STUDENT IN AN ORGANIZED HEALTH CARE EDUCATION/TRAINING PROGRAM

## 2017-05-20 PROCEDURE — 25000132 ZZH RX MED GY IP 250 OP 250 PS 637: Mod: GY | Performed by: STUDENT IN AN ORGANIZED HEALTH CARE EDUCATION/TRAINING PROGRAM

## 2017-05-20 RX ORDER — AMOXICILLIN 250 MG
1-2 CAPSULE ORAL 2 TIMES DAILY
Qty: 40 TABLET | Refills: 0 | Status: SHIPPED | OUTPATIENT
Start: 2017-05-20 | End: 2017-10-17

## 2017-05-20 RX ORDER — TOLTERODINE 2 MG/1
2 CAPSULE, EXTENDED RELEASE ORAL DAILY PRN
Qty: 12 CAPSULE | Refills: 0 | Status: SHIPPED | OUTPATIENT
Start: 2017-05-20 | End: 2017-10-17

## 2017-05-20 RX ORDER — ACETAMINOPHEN 325 MG/1
650 TABLET ORAL EVERY 4 HOURS PRN
Qty: 100 TABLET | Refills: 0 | Status: SHIPPED | OUTPATIENT
Start: 2017-05-20 | End: 2017-10-17

## 2017-05-20 RX ORDER — OXYCODONE HYDROCHLORIDE 5 MG/1
5 TABLET ORAL EVERY 4 HOURS PRN
Qty: 30 TABLET | Refills: 0 | Status: SHIPPED | OUTPATIENT
Start: 2017-05-20 | End: 2017-10-17

## 2017-05-20 RX ADMIN — SENNOSIDES AND DOCUSATE SODIUM 2 TABLET: 8.6; 5 TABLET ORAL at 07:45

## 2017-05-20 RX ADMIN — ACETAMINOPHEN 975 MG: 325 TABLET, FILM COATED ORAL at 05:00

## 2017-05-20 RX ADMIN — ACETAMINOPHEN 975 MG: 325 TABLET, FILM COATED ORAL at 11:46

## 2017-05-20 NOTE — DISCHARGE SUMMARY
Discharge Summary     Eduardo Whitley MRN# [id number]   YOB: 1945 Age: 72 year old     Date of Admission:  5/19/2017  Date of Discharge::  5/20/2017  Admitting Physician:  Everton Mathur MD  Discharge Physician:  Jayy Smith MD  Primary Care Physician:         [facility]          Admission Diagnoses:   Malignant Neoplasm Of Prostate  S/P prostatectomy            Discharge Diagnosis:   Same as above           Procedures:    Procedure(s):  DaVinci Assisted Radical Prostatectomy, Bilateral Pelvic Lymphadenectomy - Wound Class: II-Clean Contaminated        Non-operative procedures:   None performed          Consultations:   None           Imaging Studies:     Results for orders placed or performed in visit on 04/25/17   CT Chest w Contrast    Narrative    Exam: CT CHEST W CONTRAST  4/25/2017 1:14 PM    History: Evaluate for metastasis, uptake seen on bone scan, prostate  cancer    Comparison: None    Technique: Volumetric acquisition with reconstruction in the axial and  coronal planes after intravenous contrast administration.     Contrast: Isovue 370 75cc    Findings:    5 mm hypodense nodule in the thyroid isthmus. No central pulmonary  embolism. Normal heart size. Normal esophagus.    Calcified right hilar, mediastinal lymph nodes.    No effusions. Calcified splenic granulomas. Left renal upper pole of  multiple cysts. Right renal upper pole 8 mm hypodense lesion, with  mean attenuation of 66, stable since 10/6/2016 likely represent a  hyperdense cyst however is incompletely evaluated with the current  scan.    Small lipoma in the left latissimus dorsi.    Few degenerative changes at the costotransverse joints with focal  sclerosis, may account for increased uptake on the bone scan in this  location. For example 2.0 cm area of sclerosis in the right sixth rib  posteriorly Left fifth rib sclerotic lesion laterally measuring 12 mm.  Right fourth rib  minimal sclerosis about the outer cortex.    Left seventh rib 8 mm sclerotic lesion laterally.    Few pulmonary nodules, representative nodules as on series 4  1. 3 mm average dimension nodule along the minor fissure on image 180  2. 3 mm average dimension calcified right middle lobe nodule on image  185 may represent calcified granuloma.  3. 2 mm average dimension irregular nodule on image 154.      Impression    Impression:   1. Sclerotic lesions left fifth and left seventh rib laterally,  indeterminate. Consider correlation with prostate-specific  antigen/short-term follow-up.  2. Few small pulmonary nodules with no concerning nodules.  3. Sclerosis and bilateral ribs posteriorly midribs, likely secondary  to degenerative changes at the costotransverse joints.    I have personally reviewed the examination and initial interpretation  and I agree with the findings.    RAMESH TRISTAN MD            Medications Prior to Admission:     Prescriptions Prior to Admission   Medication Sig Dispense Refill Last Dose     LOSARTAN POTASSIUM PO Take 50 mg by mouth daily   5/18/2017 at 2000     Multiple vitamin TABS Take 1 tablet by mouth every evening    Past Week at Unknown time     [DISCONTINUED] Saw Palmetto 160 MG CAPS Take 480 mg by mouth every evening    Past Week at Unknown time            Discharge Medications:     Current Discharge Medication List      START taking these medications    Details   acetaminophen (TYLENOL) 325 MG tablet Take 2 tablets (650 mg) by mouth every 4 hours as needed for mild pain  Qty: 100 tablet, Refills: 0    Associated Diagnoses: S/P prostatectomy      oxyCODONE (ROXICODONE) 5 MG IR tablet Take 1 tablet (5 mg) by mouth every 4 hours as needed for moderate to severe pain  Qty: 30 tablet, Refills: 0    Associated Diagnoses: S/P prostatectomy      senna-docusate (SENOKOT-S;PERICOLACE) 8.6-50 MG per tablet Take 1-2 tablets by mouth 2 times daily  Qty: 40 tablet, Refills: 0    Associated Diagnoses:  Drug-induced constipation      tolterodine (DETROL LA) 2 MG 24 hr capsule Take 1 capsule (2 mg) by mouth daily as needed  Qty: 12 capsule, Refills: 0    Associated Diagnoses: Bladder spasm         CONTINUE these medications which have NOT CHANGED    Details   LOSARTAN POTASSIUM PO Take 50 mg by mouth daily      Multiple vitamin TABS Take 1 tablet by mouth every evening          STOP taking these medications       Saw [place] 160 MG CAPS Comments:   Reason for Stopping:                      Brief History of Illness:   Reason for admission requiring a surgical or invasive procedure:   Malignant Neoplasm Of Prostate   The patient underwent the following procedure(s):   See above   There were no immediate complications during this procedure.    Please refer to the full operative summary for details.           Hospital Course:   The patient's hospital course was unremarkable.  Eduardo Whitley recovered as anticipated and experienced no post-operative complications.      On POD#1 patient was ambulating without assitance, tolerating the discharge diet, had pain controlled with PO medications to go home with, and requiring no IV medications or fluids. Patient was discharged home with appropriate contact information, follow-up and instructions as seen below in the discharge paperwork.       Final Pathology Result:   Pending at time of discharge         Discharge Instructions and Follow-Up:     Discharge Procedure Orders  Reason for your hospital stay   Order Comments: Robotic prostatectomy     Adult [facility] Follow-up and recommended labs and tests   Order Comments: Follow up in clinic as previously scheduled    Appointments on [employer] and/or [facility] (with [facility] or [facility] provider or service). Call [phone] if you haven't heard regarding these appointments within 7 days of discharge.     Activity   Order Comments: See DC instructions   Order Specific Question Answer Comments   Is discharge order? Yes       Monitor and record   Order Comments: See DC instructions     When to contact your care team   Order Comments: See DC instructions     Wound care and dressings   Order Comments: See DC instructions     Tubes and drains   Order Comments: See DC instructions     IV access   Order Comments: See DC instructions     Discharge Instructions   Order Comments: Activity  - No strenuous exercise for 4 weeks.  - No lifting, pushing, pulling more than 10 pounds for 4 weeks.   - Do not strain with bowel movements.  - Do not drive until you can press the brake pedal quickly and fully without pain.   - Do not operate a motor vehicle while taking narcotic pain medications.     Urination    - You are going home with the following tubes or drains: durán catheter.  Nurse/ to write care and instructions.  Treat the catheter like an extension of your body; do not let it get caught or snagged on anything.  Leave the catheter in place until your follow up. Call the numbers listed above for any concerns.   - Catheter to be removed in clinic at time of follow up    Medications  - Transition from narcotic pain medications to tylenol (acetaminophen) as you are able.  Wean yourself off all pain medications as you are able.  - Some pain medications contain both tylenol (acetaminophen) and a narcotic (Norco, vicodin, percocet), do not take more than 4,000mg of Tylenol (acetaminophen) from all sources in any 24 hour period.  - Narcotics can make you constipated.  Take over the counter fiber (metamucil or benefiber) and stool softeners (miralax, docusate or senna) while taking narcotic pain medications, but stop if you develop diarrhea.  - No driving or operating machinery while taking narcotic pain medications     Follow-Up:  - Call your primary care provider to touch base regarding your recent admission.    - Call or return sooner than your regularly scheduled visit if you develop any of the following: fever (greater than 101.5),  "uncontrolled pain, uncontrolled nausea or vomiting, as well as increased redness, swelling, or drainage from your wound.     Phone numbers:   - Monday through Friday 8am to 4:30pm: Call [phone] with questions or to schedule or confirm appointment.    - Nights or weekends: call the after hours emergency pager - [phone] and tell the  \"I would like to page the Urology Resident on call.\"  - For emergencies, call [phone]     Supplies   Order Comments: Leg bag for durán     Full Code     Diet   Order Comments: See DC instructions   Order Specific Question Answer Comments   Is discharge order? Yes               Discharge Disposition:   Discharged to Home      Condition at discharge: Good    --    JOSE FRANCISCO Smith MD  Urology Resident    9:38 AM, 5/20/2017  "

## 2017-05-20 NOTE — PROGRESS NOTES
"Urology Daily Progress Note    24 hour events/Subjective:     - No acute events overnight   - Pain well controlled on current regimen   - Tolerating diet ; no nausea or vomiting   - Ambulating without difficulty    O:  Vitals: /68 (BP Location: Left arm)  Temp 97.7  F (36.5  C) (Oral)  Resp 16  Ht 1.676 m (5' 6\")  Wt 66.1 kg (145 lb 11.6 oz)  SpO2 100%  BMI 23.52 kg/m2  General: Alert, interactive, in NAD  Resp: Non-labored breathing on RA  Abdomen: Soft, appropriately-tender, non distended. Incision(s) c/d/i w/ dermabond; no erythema or drainage.  Ext: Warm and well perfused   Long/Urostomy: Clear yellow   Drain: Serosanguinous    I/O last 3 completed shifts:  In: 2830 [P.O.:480; I.V.:2100]  Out: 1500 [Urine:1270; Drains:180; Blood:50] - Last 24 hours      Labs/Imaging  Heme:  Recent Labs  Lab 05/20/17  0729 05/19/17  1333   WBC 9.0 14.5*   HGB 11.1* 13.1*    193     Chem:  Recent Labs  Lab 05/20/17  0729 05/19/17  1333   POTASSIUM 3.8 3.8   CR 0.95 0.94       Assessment/Plan  72 year old y/o male POD#1  s/p RALP.  Postoperative course unremarkable thus far with patient remaining afebrile and hemodynamically stable with reassuring findings on physical exam and laboratory evaluation.      NEURO Pain well controlled - home on oral meds   CV HDS - resume losartan on discharge   PULM Aggressive pulmonary toilet and I/S.   FEN/GI Advance to regular diet this morning     Long to remain until follow up after discharge.  NATALIIA creatinine at serum - to be removed this morning   HEME Hgb as above.   ID Afebrile, Postoperative leukocytosis resolved     ENDO No issues   ACTIVITY Up as tolerated   PPx Ambulation   DISPO DC to home this afternoon         Seen and examined with chief resident and Dr. Mathur    --    Jayy Smith MD  Urology Resident           Contacting the Urology Team     Please use the following job codes to reach the Urology Team. Note that you must use an in house phone and that " job codes cannot receive text pages.     On weekdays, dial [phone] (or star-star-star 777 on the new [employer] telephones) then 0817 to reach the Adult Urology resident or PA on call    On weekdays, dial [phone] (or star-star-star 777 on the new [employer] telephones) then 0818 to reach the Pediatric Urology resident    On weeknights and weekends, dial [phone] (or star-star-star 777 on the new [employer] telephones) then [phone] to reach the Urology resident on call (for both Adult and Pediatrics)

## 2017-05-20 NOTE — PLAN OF CARE
Problem: Goal Outcome Summary  Goal: Goal Outcome Summary  Vital signs:  Temp: 97.1  F (36.2  C) Temp src: Oral BP: 117/70   Heart Rate: 83 Resp: 18 SpO2: 97 % O2 Device: None (Room air)   VSS. Pt states pain is well controlled, scheduled tylenol administered for pain. Lap sites C/D/I. Long in place with adequate output. NATALIIA drain with serosanguinous output. Up to bathroom x 1, pt reports small loose BM. Fluids infusing via IV. Sleeping throughout the night.

## 2017-05-20 NOTE — PLAN OF CARE
"Problem: Individualization  Goal: Patient Preferences  Outcome: No Change  /77 (BP Location: Left arm)  Temp 98.3  F (36.8  C) (Oral)  Resp 16  Ht 1.676 m (5' 6\")  Wt 66.1 kg (145 lb 11.6 oz)  SpO2 97%  BMI 23.52 kg/m2     AVSS.  Reported abd pain and received PRN dilaudid and scheduled tylenol with relief.  Tolerating full liquid diet, denies n/v.  Ambulated with SBA x1 in hallway x2. Denies passing flatus and BM.  Left abd NATALIIA intact with 90 ml blood/serosang out.  Long with adequate UOP.  MIVF at 100 ml/hr.  Continue POC.       "

## 2017-05-20 NOTE — PLAN OF CARE
Problem: Discharge Planning  Goal: Discharge Planning (Adult, OB, Behavioral, Peds)  Outcome: Adequate for Discharge Date Met:  05/20/17  Vitals:     05/19/17 2312 05/20/17 0415 05/20/17 0755 05/20/17 1217   BP: 118/77 117/70 137/68 133/80   BP Location: Left arm Right arm Left arm Right arm   Resp: 16 18 16 16   Temp: 98.3  F (36.8  C) 97.1  F (36.2  C) 97.7  F (36.5  C) 98.6  F (37  C)   TempSrc: Oral Oral Oral Oral   SpO2: 97%   100% 97%   Weight:           Height:           Avss. Pain well controled with scheduled tylenol. Declined any oxycodone. Good intake at breakfast. Declined lunch. No c/o n/v. Lap sites with derma-bonded and clean dry and intact. PIV x 2 dc/ed. OOB walking on unit with SBA. NATALIIA taken out. Dressing to  site changed x1. Discussed dc orders and f/u appointments with pt. Leg bag and dressing change supplies given for  site if needed. New night bag in place. A leg bag given to pt. Both pt and his spouse shown how to change night/leg bag. Written copy of dc instructions given to pt. Rx being picked up from dc pharmacy. Stable. Ready to discharge home.

## 2017-05-22 ENCOUNTER — TELEPHONE (OUTPATIENT)
Dept: UROLOGY | Facility: CLINIC | Age: 72
End: 2017-05-22

## 2017-05-22 ENCOUNTER — PRE VISIT (OUTPATIENT)
Dept: UROLOGY | Facility: CLINIC | Age: 72
End: 2017-05-22

## 2017-05-22 ENCOUNTER — CARE COORDINATION (OUTPATIENT)
Dept: CARE COORDINATION | Facility: CLINIC | Age: 72
End: 2017-05-22

## 2017-05-22 NOTE — TELEPHONE ENCOUNTER
Patient having a lot of blood around the catheter especially upon exertion with BM's  He states catheter is working fine and has alittle bit of blood in the urine and very small clots.  Told to push fluids and make sure to call with any symptoms. Otilia Potts LPN Staff Nurse

## 2017-05-22 NOTE — TELEPHONE ENCOUNTER
Post op.  S/P DaVinci Assisted Radical Prostatectomy, Bilateral Pelvic Lymphadenectomy on 5-19-17.  Records available in [facility].

## 2017-05-22 NOTE — PROGRESS NOTES
"[facility]  \"Hello, my name is Ghazala Sanchez , and I am calling from the [facility].  I want to check in and see how you are doing, after leaving the hospital.  You may also receive a call from your Care Coordinator (care team), but I want to make sure you don t have any urgent needs.  I have a couple questions to review with you:     Post-Discharge Outreach                                                    Eduardo Whitley is a 72 year old male     Follow-up Appointments           Adult [facility] Follow-up and recommended labs and tests       Follow up in clinic as previously scheduled     Appointments on [place] and/or [facility] (with [facility] or [facility] provider or service). Call [phone] if you haven't heard regarding these appointments within 7 days of discharge.                       Your next 10 appointments already scheduled            May 31, 2017 2:30 PM CDT   XR CYSTOGRAM with UCXR2, UC GIGU RAD, UC IMAGING NURSE   [facility] ([facility])     [address]  1st Floor  [facility] [address] [phone]                 Please bring a list of your current medicines to your exam. (Include vitamins, minerals and over-thecounter medicines.) Leave your valuables at home. Tell your doctor if there is a chance you may be pregnant. You do not need to do anything special for this exam.                  May 31, 2017 3:30 PM CDT   (Arrive by 3:15 PM)   Post-Op with Aidan Biggs MD   [facility] Urology and [facility] ([facility])     [address]  4th Floor  [facility] [address] [phone]              Care Team:    Patient Care Team       Relationship Specialty Notifications [facility] Yael PCP - General   5/9/17     Comment:  Rosemary Ceballos is pt/s PCP PER PT    Phone: [phone] Fax: [phone] [address] " [facility] [address]    Zeenat Villar Referring Physician   2/13/17     Comment:  REFERRING TO UROLOGY    Phone: [phone] Fax: [phone]         [facility] UROLOGY [address] [facility] [address]    Everton Mathur MD MD Urology  2/14/17     Phone: [phone] Fax: [phone]          [facility] [address] [facility] [address] [facility] [address]    Aidan Biggs MD MD Urology  4/5/17     Phone: [phone] Fax: [phone]         [facility] [address] [facility] [address]    Leydi Zimmerman, RN Registered Nurse Urology  4/5/17     Phone: [phone] Pager: [phone]                Transition of Care Review                                                      Patient was contacted by an LPN for post DC follow up so no duplicate post DC follow up call will be made          Otilia Potts LPN   Oncology      []  Patient having a lot of blood around the catheter especially upon exertion with BM's He states catheter is working fine and has alittle bit of blood in the urine and very small clots. Told to push fluids and make sure to call with any symptoms. Otilia Potts LPN Staff Nurse                     Plan                                                      Thanks for your time.  Your Care Coordinator may follow-up within the next couple days.  In the meantime if you have questions, concerns or problems call your care team.        Ghazala Sanchez

## 2017-05-24 LAB — COPATH REPORT: NORMAL

## 2017-05-25 ENCOUNTER — CARE COORDINATION (OUTPATIENT)
Dept: UROLOGY | Facility: CLINIC | Age: 72
End: 2017-05-25

## 2017-05-25 NOTE — PROGRESS NOTES
Urology Postop Phone Note:    Mr. Eduardo Whitley is a 72 year old male who underwent DaVinci Assisted Radical Prostatectomy, Bilateral Pelvic Lymphadenectomy  on 5/19/17 with Dr. Mathur for a history of prostate cancer.  His postoperative course was unremarkable and the patient was d/c to home on 5/20/17.    Fevers/chills: Patient denies any fever or chills.  Eating/drinking: Patient is able to eat and drink without any complaints.  Bowel habits: Patient reports having a normal bowel movement.  Urine output Long catheter   Incisions: Patient denies any signs and symptoms of infection.  Pain: Patient reports pain as a 0 out of 10.    Narcotics: The patient denies taking any pain medication.    Follow up appointment scheduled on 5/31/13 at 3:30 with Dr. Biggs.    Informed the patient of the clinic triage nursing # ([phone] option 2) and urology resident on call # for after hours concerns.    Thanks,   Leydi Zimmerman   Department of Urologic Surgery

## 2017-05-31 ENCOUNTER — OFFICE VISIT (OUTPATIENT)
Dept: UROLOGY | Facility: CLINIC | Age: 72
End: 2017-05-31

## 2017-05-31 VITALS
WEIGHT: 149 LBS | SYSTOLIC BLOOD PRESSURE: 110 MMHG | HEART RATE: 73 BPM | HEIGHT: 67 IN | BODY MASS INDEX: 23.39 KG/M2 | DIASTOLIC BLOOD PRESSURE: 70 MMHG

## 2017-05-31 DIAGNOSIS — C61 PROSTATE CANCER (H): Primary | ICD-10-CM

## 2017-05-31 RX ORDER — TADALAFIL 5 MG/1
5 TABLET ORAL
Qty: 20 TABLET | Refills: 3 | Status: SHIPPED | OUTPATIENT
Start: 2017-05-31 | End: 2018-04-18

## 2017-05-31 ASSESSMENT — PAIN SCALES - GENERAL: PAINLEVEL: NO PAIN (0)

## 2017-05-31 NOTE — PATIENT INSTRUCTIONS
Please follow up in 6 weeks with a PSA.    It was a pleasure meeting with you today.  Thank you for allowing me and my team the privilege of caring for you today.  YOU are the reason we are here, and I truly hope we provided you with the excellent service you deserve.  Please let us know if there is anything else we can do for you so that we can be sure you are leaving completely satisfied with your care experience.

## 2017-05-31 NOTE — LETTER
"5/31/2017       RE: Eduardo Whitley  [address] [facility] [address]     Dear Colleague,    Thank you for referring your patient, Eduardo Whitley, to the [facility] UROLOGY AND INST FOR PROSTATE AND UROLOGIC CANCERS at [facility]. Please see a copy of my visit note below.    Urology Clinic Note  Date: 5/31/2017  Time: 4:02 PM  Patient: Eduardo Whitley  MRN: [id number]    HPI/Subjective: Eduardo Whitley is a 72 year old male s/p RALP. Doing well. No pain, + BMs.     Objective:  /70  Pulse 73  Ht 1.689 m (5' 6.5\")  Wt 67.6 kg (149 lb)  BMI 23.69 kg/m2  Gen: In NAD, conversant.  Resp: Breathing non-labored  CV: Extremities warm.  Abd: Soft, non-distended, non-tender.    Assessment & Plan: Eduardo Whitley is a 72 year old male s/p RALP.   Cystogram reviewed - no extravasation  Pathology Reviewed - Nabeel 9, [id number]?     - Void trial today, begin Kegel exercises    - Decipher Test    - Start Kegels, 5 mg Cialis    - F/u in 6 weeks with PSA - will re-evaluate ribs with CT chest on next visit     ALCIRA Biggs MD  "

## 2017-05-31 NOTE — MR AVS SNAPSHOT
After Visit Summary   5/31/2017    Eduardo Whitley    MRN: [id number]           Patient Information     Date Of Birth          1945        Visit Information        Provider Department      5/31/2017 3:30 PM Aidan Biggs MD [facility] Urology and [facility] for Prostate and Urologic Cancers        Today's Diagnoses     Prostate cancer (H)    -  1      Care Instructions    Please follow up in 6 weeks with a PSA.    It was a pleasure meeting with you today.  Thank you for allowing me and my team the privilege of caring for you today.  YOU are the reason we are here, and I truly hope we provided you with the excellent service you deserve.  Please let us know if there is anything else we can do for you so that we can be sure you are leaving completely satisfied with your care experience.                  Follow-ups after your visit        Follow-up notes from your care team     Return in about 6 weeks (around 7/12/2017) for Lab Work, Routine Visit.      Your next 10 appointments already scheduled     Jul 12, 2017  3:00 PM CDT   LAB with [facility] ([facility])    [address] [place] [address] [phone]           Patient must bring picture ID.  Patient should be prepared to give a urine specimen  Please do not eat 10-12 hours before your appointment if you are coming in fasting for labs on lipids, cholesterol, or glucose (sugar).  Pregnant women should follow their Care Team instructions. Water with medications is okay. Do not drink coffee or other fluids.   If you have concerns about taking  your medications, please ask at office or if scheduling via [employer], send a message by clicking on Secure Messaging, Message Your Care Team.            Jul 12, 2017  4:30 PM CDT   (Arrive by 4:15 PM)   Return Visit with Aidan Biggs MD   [facility] Urology and [facility] for [facility] and Urologic Cancers ([facility])    [facility]  "[address] [id number] [phone]              Future tests that were ordered for you today     Open Future Orders        Priority Expected Expires Ordered    PSA ultra sensitive Routine  5/31/2018 5/31/2017            Who to contact     Please call your clinic at [phone] to:    Ask questions about your health    Make or cancel appointments    Discuss your medicines    Learn about your test results    Speak to your doctor   If you have compliments or concerns about an experience at your clinic, or if you wish to file a complaint, please contact [facility] Patient Relations at [phone] or email us at [email or web address]         Additional Information About Your Visit        [employer] Information     [employer] gives you secure access to your electronic health record. If you see a primary care provider, you can also send messages to your care team and make appointments. If you have questions, please call your primary care clinic.  If you do not have a primary care provider, please call [phone] and they will assist you.      [employer] is an electronic gateway that provides easy, online access to your medical records. With [employer], you can request a clinic appointment, read your test results, renew a prescription or communicate with your care team.     To access your existing account, please contact your [facility] or call [phone] for assistance.        Care EveryWhere ID     This is your Care EveryWhere ID. This could be used by other organizations to access your [place] medical records  [id number]        Your Vitals Were     Pulse Height BMI (Body Mass Index)             73 1.689 m (5' 6.5\") 23.69 kg/m2          Blood Pressure from Last 3 Encounters:   05/31/17 110/70   05/20/17 133/80   05/05/17 (!) 170/94    Weight from Last 3 Encounters:   05/31/17 67.6 kg (149 lb)   05/19/17 66.1 kg (145 lb 11.6 " oz)   05/05/17 67.5 kg (148 lb 14.4 oz)              We Performed the Following     Decipher Prostate Cancer Test: Clinic Lab Order          Today's Medication Changes          These changes are accurate as of: 5/31/17  4:12 PM.  If you have any questions, ask your nurse or doctor.               Start taking these medicines.        Dose/Directions    tadalafil 5 MG tablet   Commonly known as:  CIALIS   Used for:  Prostate cancer (H)   Started by:  Aidan Biggs MD        Dose:  5 mg   Take 1 tablet (5 mg) by mouth three times a week   Quantity:  20 tablet   Refills:  3            Where to get your medicines      These medications were sent to [facility]/pharmacy #[id number] [place], [place] - [address] [place], [place] [address] [address] [place] [address]     Phone:  [phone]     tadalafil 5 MG tablet                Primary Care Provider Office Phone # Fax #    [facility] [phone]       [address] [place] [address]        Thank you!     Thank you for choosing [facility] UROLOGY AND [facility] FOR PROSTATE AND UROLOGIC CANCERS  for your care. Our goal is always to provide you with excellent care. Hearing back from our patients is one way we can continue to improve our services. Please take a few minutes to complete the written survey that you may receive in the mail after your visit with us. Thank you!             Your Updated Medication List - Protect others around you: Learn how to safely use, store and throw away your medicines at www.disposemymeds.org.          This list is accurate as of: 5/31/17  4:12 PM.  Always use your most recent med list.                   Brand Name Dispense Instructions for use    acetaminophen 325 MG tablet    TYLENOL    100 tablet    Take 2 tablets (650 mg) by mouth every 4 hours as needed for mild pain       LOSARTAN POTASSIUM PO      Take 50 mg by mouth daily       Multiple vitamin Tabs      Take 1 tablet by mouth every evening       oxyCODONE 5 MG IR tablet     ROXICODONE    30 tablet    Take 1 tablet (5 mg) by mouth every 4 hours as needed for moderate to severe pain       senna-docusate 8.6-50 MG per tablet    SENOKOT-S;PERICOLACE    40 tablet    Take 1-2 tablets by mouth 2 times daily       tadalafil 5 MG tablet    CIALIS    20 tablet    Take 1 tablet (5 mg) by mouth three times a week       tolterodine 2 MG 24 hr capsule    DETROL LA    12 capsule    Take 1 capsule (2 mg) by mouth daily as needed

## 2017-05-31 NOTE — PROGRESS NOTES
"Urology Clinic Note      Date: 5/31/2017  Time: 4:02 PM  Patient: Eduardo Whitley  MRN: [id number]    HPI/Subjective: Eduardo Whitley is a 72 year old male s/p RALP. Doing well. No pain, + BMs.     Objective:  /70  Pulse 73  Ht 1.689 m (5' 6.5\")  Wt 67.6 kg (149 lb)  BMI 23.69 kg/m2  Gen: In NAD, conversant.  Resp: Breathing non-labored  CV: Extremities warm.  Abd: Soft, non-distended, non-tender.    Assessment & Plan: Eduardo Whitley is a 72 year old male s/p RALP.   Cystogram reviewed - no extravasation  Pathology Reviewed - Nabeel 9, [id number]?     - Void trial today, begin Kegel exercises    - Decipher Test    - Start Kegels, 5 mg Cialis    - F/u in 6 weeks with PSA - will re-evaluate ribs with CT chest on next visit     ALCIRA Biggs MD      "

## 2017-05-31 NOTE — NURSING NOTE
"Chief Complaint   Patient presents with     Surgical Followup     Post op.  S/P DaVinci Assisted Radical Prostatectomy, Bilateral Pelvic Lymphadenectomy on 5-19-17.        Initial Ht 1.689 m (5' 6.5\")  Wt 67.6 kg (149 lb)  BMI 23.69 kg/m2 Estimated body mass index is 23.69 kg/(m^2) as calculated from the following:    Height as of this encounter: 1.689 m (5' 6.5\").    Weight as of this encounter: 67.6 kg (149 lb).  Medication Reconciliation: complete     TIFFANY Cox    "

## 2017-05-31 NOTE — NURSING NOTE
Eduardo Whitley presented today for a trial of void.   Approximately 120 mL of normal saline instilled into bladder via catheter.  Patient stated He had urge to urinate and catheter was removed without difficulty.  Patient was given a urinal to measure urine output.  Patient voided approximately 100 mL of yellow urine.   Patient did tolerate procedure well.   Ciprofloxacin 500 mg given per protocol.  Teaching done with patient verbally as where to call or go if pain, fever, or unable to urinate post catheter removal.    Mirta Cuevas RN  5/31/2017  4:06 PM

## 2017-06-07 ENCOUNTER — TRANSFERRED RECORDS (OUTPATIENT)
Dept: HEALTH INFORMATION MANAGEMENT | Facility: CLINIC | Age: 72
End: 2017-06-07

## 2017-06-23 LAB — LAB SCANNED RESULT: NORMAL

## 2017-07-07 ENCOUNTER — PRE VISIT (OUTPATIENT)
Dept: UROLOGY | Facility: CLINIC | Age: 72
End: 2017-07-07

## 2017-07-12 ENCOUNTER — OFFICE VISIT (OUTPATIENT)
Dept: UROLOGY | Facility: CLINIC | Age: 72
End: 2017-07-12

## 2017-07-12 VITALS
HEART RATE: 70 BPM | SYSTOLIC BLOOD PRESSURE: 120 MMHG | BODY MASS INDEX: 23.85 KG/M2 | WEIGHT: 150 LBS | DIASTOLIC BLOOD PRESSURE: 76 MMHG

## 2017-07-12 DIAGNOSIS — C61 PROSTATE CANCER (H): Primary | ICD-10-CM

## 2017-07-12 DIAGNOSIS — C61 PROSTATE CANCER (H): ICD-10-CM

## 2017-07-12 NOTE — PROGRESS NOTES
Urology Clinic Note      Date: 7/12/2017  Time: 6:15 PM  Patient: Eduardo Whitley  MRN: [id number]    HPI/Subjective: Eduardo Whitley is a 72 year old male with Buckeye 9 CaP s/p RALP - pT3aN0. Doing well, urinary incontinence improving. On cialis 3 x per week.    Objective:  /76  Pulse 70  Wt 68 kg (150 lb)  BMI 23.85 kg/m2  Gen: In NAD, conversant.  Resp: Breathing non-labored  CV: Extremities warm.  Abd: Soft, non-distended, non-tender.    Assessment & Plan: Eduardo Whitley is a 72 year old male with Nabeel 9 CaP s/p RALP - pT3aN0.     - PSA pending - pt will call me next week to discuss on the phone - pt lives in [place]    - Pt will also need chest CT and possibly bone scan to follow up on rib/lung lesions             ALCIRA Biggs MD

## 2017-07-12 NOTE — MR AVS SNAPSHOT
After Visit Summary   7/12/2017    Eduardo Whitley    MRN: [id number]           Patient Information     Date Of Birth          1945        Visit Information        Provider Department      7/12/2017 4:30 PM Aidan Biggs MD [facility] Urology and [facility] for Prostate and Urologic Cancers        Today's Diagnoses     Prostate cancer (H)    -  1       Follow-ups after your visit        Who to contact     Please call your clinic at [phone] to:    Ask questions about your health    Make or cancel appointments    Discuss your medicines    Learn about your test results    Speak to your doctor   If you have compliments or concerns about an experience at your clinic, or if you wish to file a complaint, please contact [facility] Patient Relations at [phone] or email us at [email or web address]         Additional Information About Your Visit        MyChart Information     [employer] gives you secure access to your electronic health record. If you see a primary care provider, you can also send messages to your care team and make appointments. If you have questions, please call your primary care clinic.  If you do not have a primary care provider, please call [phone] and they will assist you.      [employer] is an electronic gateway that provides easy, online access to your medical records. With [employer], you can request a clinic appointment, read your test results, renew a prescription or communicate with your care team.     To access your existing account, please contact your [facility] or call [phone] for assistance.        Care EveryWhere ID     This is your Care EveryWhere ID. This could be used by other organizations to access your [place] medical records  [id number]        Your Vitals Were     Pulse BMI (Body Mass Index)                70 23.85 kg/m2           Blood Pressure from Last 3 Encounters:   07/12/17 120/76    05/31/17 110/70   05/20/17 133/80    Weight from Last 3 Encounters:   07/12/17 68 kg (150 lb)   05/31/17 67.6 kg (149 lb)   05/19/17 66.1 kg (145 lb 11.6 oz)              Today, you had the following     No orders found for display       Primary Care Provider Office Phone # Fax #    [facility] [phone]       [address] [place] [address]        Equal Access to Services     [place] SANTIAGO : Hadii aad ku hadasho Soomaali, waaxda luqadaha, qaybta kaalmada adeegyada, waxay idiin hayken magali swanson. So [facility] [phone].    ATENCIÓN: Si habla español, tiene a morrow disposición servicios gratuitos de asistencia lingüística. Llame al [phone].    We comply with applicable federal civil rights laws and [place] laws. We do not discriminate on the basis of race, color, national origin, age, disability sex, sexual orientation or gender identity.            Thank you!     Thank you for choosing [facility] UROLOGY AND [facility] FOR PROSTATE AND UROLOGIC CANCERS  for your care. Our goal is always to provide you with excellent care. Hearing back from our patients is one way we can continue to improve our services. Please take a few minutes to complete the written survey that you may receive in the mail after your visit with us. Thank you!             Your Updated Medication List - Protect others around you: Learn how to safely use, store and throw away your medicines at www.disposemymeds.org.          This list is accurate as of: 7/12/17  6:20 PM.  Always use your most recent med list.                   Brand Name Dispense Instructions for use Diagnosis    acetaminophen 325 MG tablet    TYLENOL    100 tablet    Take 2 tablets (650 mg) by mouth every 4 hours as needed for mild pain    S/P prostatectomy       LOSARTAN POTASSIUM PO      Take 50 mg by mouth daily        Multiple vitamin Tabs      Take 1 tablet by mouth every evening        oxyCODONE 5 MG IR tablet    ROXICODONE    30  tablet    Take 1 tablet (5 mg) by mouth every 4 hours as needed for moderate to severe pain    S/P prostatectomy       senna-docusate 8.6-50 MG per tablet    SENOKOT-S;PERICOLACE    40 tablet    Take 1-2 tablets by mouth 2 times daily    Drug-induced constipation       tadalafil 5 MG tablet    CIALIS    20 tablet    Take 1 tablet (5 mg) by mouth three times a week    Prostate cancer (H)       tolterodine 2 MG 24 hr capsule    DETROL LA    12 capsule    Take 1 capsule (2 mg) by mouth daily as needed    Bladder spasm

## 2017-07-12 NOTE — NURSING NOTE
"Chief Complaint   Patient presents with     RECHECK     Prostate cancer follow up.        Initial /76  Pulse 70  Wt 68 kg (150 lb)  BMI 23.85 kg/m2 Estimated body mass index is 23.85 kg/(m^2) as calculated from the following:    Height as of 5/31/17: 1.689 m (5' 6.5\").    Weight as of this encounter: 68 kg (150 lb).  Medication Reconciliation: complete     Jessica Escoto, student    "

## 2017-07-12 NOTE — LETTER
7/12/2017       RE: Edurado Whitley  [address]  [facility] [address]     Dear Colleague,    Thank you for referring your patient, Eduardo Whitley, to the [facility] UROLOGY AND INST FOR PROSTATE AND UROLOGIC CANCERS at [facility]. Please see a copy of my visit note below.    Urology Clinic Note      Date: 7/12/2017  Time: 6:15 PM  Patient: Eduardo Whitley  MRN: [id number]    HPI/Subjective: Eduardo Whitley is a 72 year old male with Manito 9 CaP s/p RALP - pT3aN0. Doing well, urinary incontinence improving. On cialis 3 x per week.    Objective:  /76  Pulse 70  Wt 68 kg (150 lb)  BMI 23.85 kg/m2  Gen: In NAD, conversant.  Resp: Breathing non-labored  CV: Extremities warm.  Abd: Soft, non-distended, non-tender.    Assessment & Plan: Eduardo Whitley is a 72 year old male with Manito 9 CaP s/p RALP - pT3aN0.     - PSA pending - pt will call me next week to discuss on the phone - pt lives in [place]    - Pt will also need chest CT and possibly bone scan to follow up on rib/lung lesions             ALCIRA Biggs MD      Again, thank you for allowing me to participate in the care of your patient.      Sincerely,    Aidan Biggs MD

## 2017-07-14 LAB — PSA SERPL DL<=0.01 NG/ML-MCNC: NORMAL UG/L

## 2017-07-20 ENCOUNTER — TELEPHONE (OUTPATIENT)
Dept: UROLOGY | Facility: CLINIC | Age: 72
End: 2017-07-20

## 2017-07-20 DIAGNOSIS — C61 PROSTATE CANCER (H): Primary | ICD-10-CM

## 2017-07-24 ENCOUNTER — TELEPHONE (OUTPATIENT)
Dept: UROLOGY | Facility: CLINIC | Age: 72
End: 2017-07-24

## 2017-07-24 NOTE — TELEPHONE ENCOUNTER
----- Message from Aidan Biggs MD sent at 7/21/2017  5:19 PM CDT -----  Contact: [phone]  PSA undetectable. He needs to come in with ultrasensitive psa in 3 months. Thanks.   ----- Message -----     From: Kendall Potts LPN     Sent: 7/20/2017   8:45 AM       To: Aidan Biggs MD    Patient called again his recent psa result is in and he wants to know if he should follow up in 3 months or just psa ultra and you mentioned scans do you want that done??? kendall

## 2017-10-10 DIAGNOSIS — C61 PROSTATE CANCER (H): ICD-10-CM

## 2017-10-12 LAB — PSA SERPL DL<=0.01 NG/ML-MCNC: <0.01 NG/ML (ref 0–4)

## 2017-10-13 ENCOUNTER — PRE VISIT (OUTPATIENT)
Dept: UROLOGY | Facility: CLINIC | Age: 72
End: 2017-10-13

## 2017-10-17 ENCOUNTER — OFFICE VISIT (OUTPATIENT)
Dept: UROLOGY | Facility: CLINIC | Age: 72
End: 2017-10-17

## 2017-10-17 VITALS
WEIGHT: 157.4 LBS | SYSTOLIC BLOOD PRESSURE: 155 MMHG | BODY MASS INDEX: 25.3 KG/M2 | HEART RATE: 59 BPM | DIASTOLIC BLOOD PRESSURE: 92 MMHG | HEIGHT: 66 IN

## 2017-10-17 DIAGNOSIS — C61 PROSTATE CANCER (H): Primary | ICD-10-CM

## 2017-10-17 DIAGNOSIS — N50.89 SCROTAL MASS: ICD-10-CM

## 2017-10-17 ASSESSMENT — PAIN SCALES - GENERAL: PAINLEVEL: NO PAIN (0)

## 2017-10-17 NOTE — PATIENT INSTRUCTIONS
- Will schedule CT chest to assess for interval change - will hold off until next spring.    - Left scrotal ultrasound today  - Return in 3 months with a PSA  - It was nice meeting you!    Kaci Patel PA-C

## 2017-10-17 NOTE — NURSING NOTE
"Chief Complaint   Patient presents with     RECHECK     Prostate cancer follow up       Initial BP (!) 155/92  Pulse 59  Ht 1.664 m (5' 5.5\")  Wt 71.4 kg (157 lb 6.4 oz)  BMI 25.79 kg/m2 Estimated body mass index is 25.79 kg/(m^2) as calculated from the following:    Height as of this encounter: 1.664 m (5' 5.5\").    Weight as of this encounter: 71.4 kg (157 lb 6.4 oz).  Medication Reconciliation: complete     TIFFANY Cox    "

## 2017-10-17 NOTE — MR AVS SNAPSHOT
After Visit Summary   10/17/2017    Eduardo Whitley    MRN: [id number]           Patient Information     Date Of Birth          1945        Visit Information        Provider Department      10/17/2017 12:00 PM Sylwia Patel PA [facility] and [facility] for Prostate and Urologic Cancers        Today's Diagnoses     Prostate cancer (H)    -  1    Scrotal mass          Care Instructions    - Will schedule CT chest to assess for interval change - will hold off until next spring.    - Left scrotal ultrasound today  - Return in 3 months with a PSA  - It was nice meeting you!    Kaci Patel PA-C          Follow-ups after your visit        Your next 10 appointments already scheduled     Jan 23, 2018 10:00 AM CST   LAB with  LAB   [facility] ([facility])    [address] [place] [id number] [phone]           Patient must bring picture ID. Patient should be prepared to give a urine specimen  Please do not eat 10-12 hours before your appointment if you are coming in fasting for labs on lipids, cholesterol, or glucose (sugar). Pregnant women should follow their Care Team instructions. Water with medications is okay. Do not drink coffee or other fluids. If you have concerns about taking  your medications, please ask at office or if scheduling via [employer], send a message by clicking on Secure Messaging, Message Your Care Team.            Jan 23, 2018 10:45 AM CST   US TESTICULAR AND SCROTUM WITH DOPPLER LIMITED with UCUS1   [facility] ([facility])    [address] [place] [address] [phone]           Please bring a list of your medicines (including vitamins, minerals and over-the-counter drugs). Also, tell your doctor about any allergies you may have. Wear comfortable clothes and leave your valuables at home.  You do not need to do anything special to prepare for your  exam.  Please call the Imaging Department at your exam site with any questions.            Jan 23, 2018 12:30 PM CST   (Arrive by 12:15 PM)   Return Visit with CRISTAL Persaud   [facility] and [facility] for Prostate and Urologic Cancers ([facility])    [address] [id number] [phone]              Future tests that were ordered for you today     Open Future Orders        Priority Expected Expires Ordered    US Testicular & Scrotum w Doppler Ltd Routine 10/17/2017 3/16/2018 10/17/2017    US SCROTUM AND CONTENTS Routine 10/17/2017 4/15/2018 10/17/2017    PSA tumor marker Routine 12/16/2017 3/16/2018 10/17/2017            Who to contact     Please call your clinic at [phone] to:    Ask questions about your health    Make or cancel appointments    Discuss your medicines    Learn about your test results    Speak to your doctor   If you have compliments or concerns about an experience at your clinic, or if you wish to file a complaint, please contact [facility] Patient Relations at [phone] or email us at [email or web address]         Additional Information About Your Visit        [employer] Information     [employer] gives you secure access to your electronic health record. If you see a primary care provider, you can also send messages to your care team and make appointments. If you have questions, please call your primary care clinic.  If you do not have a primary care provider, please call [phone] and they will assist you.      L[employer]idText is an electronic gateway that provides easy, online access to your medical records. With LiquidText, you can request a clinic appointment, read your test results, renew a prescription or communicate with your care team.     To access your existing account, please contact your [facility] or call [phone] for assistance.        Care  "EveryWhere ID     This is your Care EveryWhere ID. This could be used by other organizations to access your [place] medical records  [id number]        Your Vitals Were     Pulse Height BMI (Body Mass Index)             59 1.664 m (5' 5.5\") 25.79 kg/m2          Blood Pressure from Last 3 Encounters:   10/17/17 (!) 155/92   07/12/17 120/76   05/31/17 110/70    Weight from Last 3 Encounters:   10/17/17 71.4 kg (157 lb 6.4 oz)   07/12/17 68 kg (150 lb)   05/31/17 67.6 kg (149 lb)                 Today's Medication Changes          These changes are accurate as of: 10/17/17  1:33 PM.  If you have any questions, ask your nurse or doctor.               Stop taking these medicines if you haven't already. Please contact your care team if you have questions.     acetaminophen 325 MG tablet   Commonly known as:  TYLENOL   Stopped by:  Sylwia Patel PA           oxyCODONE 5 MG IR tablet   Commonly known as:  ROXICODONE   Stopped by:  Sylwia Patel PA           senna-docusate 8.6-50 MG per tablet   Commonly known as:  SENOKOT-S;PERICOLACE   Stopped by:  Sylwia Patel PA           tolterodine 2 MG 24 hr capsule   Commonly known as:  DETROL [place]   Stopped by:  Sylwia Patel PA                    Primary Care Provider Office Phone # Fax #    [facility] [phone]       [address] [place], [place] [id number]        Equal Access to Services     [facility] AH: Hadii hans ku hadasho Soomaali, waaxda luqadaha, qaybta kaalmada adeegyada, waxervin hanna swanson. So [facility] [phone].    ATENCIÓN: Si habla español, tiene a morrow disposición servicios gratuitos de asistencia lingüística. Llame al [phone].    We comply with applicable federal civil rights laws and [place] laws. We do not discriminate on the basis of race, color, national origin, age, disability, sex, sexual orientation, or gender identity.            Thank you!     Thank you for choosing [facility] " UROLOGY AND INST FOR PROSTATE AND UROLOGIC CANCERS  for your care. Our goal is always to provide you with excellent care. Hearing back from our patients is one way we can continue to improve our services. Please take a few minutes to complete the written survey that you may receive in the mail after your visit with us. Thank you!             Your Updated Medication List - Protect others around you: Learn how to safely use, store and throw away your medicines at www.disposemymeds.org.          This list is accurate as of: 10/17/17  1:33 PM.  Always use your most recent med list.                   Brand Name Dispense Instructions for use Diagnosis    LOSARTAN POTASSIUM PO      Take 50 mg by mouth daily        Multiple vitamin Tabs      Take 1 tablet by mouth every evening        tadalafil 5 MG tablet    CIALIS    20 tablet    Take 1 tablet (5 mg) by mouth three times a week    Prostate cancer (H)
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
## 2018-01-19 ENCOUNTER — PRE VISIT (OUTPATIENT)
Dept: UROLOGY | Facility: CLINIC | Age: 73
End: 2018-01-19

## 2018-03-09 ENCOUNTER — RADIANT APPOINTMENT (OUTPATIENT)
Dept: ULTRASOUND IMAGING | Facility: CLINIC | Age: 73
End: 2018-03-09
Attending: PHYSICIAN ASSISTANT
Payer: MEDICARE

## 2018-03-09 DIAGNOSIS — C61 PROSTATE CANCER (H): ICD-10-CM

## 2018-03-09 DIAGNOSIS — N50.89 SCROTAL MASS: ICD-10-CM

## 2018-03-09 LAB — PSA SERPL-MCNC: <0.01 UG/L (ref 0–4)

## 2018-04-13 ENCOUNTER — PRE VISIT (OUTPATIENT)
Dept: UROLOGY | Facility: CLINIC | Age: 73
End: 2018-04-13

## 2018-04-18 ENCOUNTER — OFFICE VISIT (OUTPATIENT)
Dept: UROLOGY | Facility: CLINIC | Age: 73
End: 2018-04-18
Payer: MEDICARE

## 2018-04-18 VITALS
WEIGHT: 145 LBS | HEIGHT: 66 IN | HEART RATE: 63 BPM | BODY MASS INDEX: 23.3 KG/M2 | DIASTOLIC BLOOD PRESSURE: 100 MMHG | SYSTOLIC BLOOD PRESSURE: 149 MMHG

## 2018-04-18 DIAGNOSIS — C61 MALIGNANT NEOPLASM OF PROSTATE (H): Primary | ICD-10-CM

## 2018-04-18 PROBLEM — I10 ESSENTIAL HYPERTENSION: Status: ACTIVE | Noted: 2017-09-26

## 2018-04-18 ASSESSMENT — PAIN SCALES - GENERAL: PAINLEVEL: NO PAIN (0)

## 2018-04-18 NOTE — MR AVS SNAPSHOT
After Visit Summary   4/18/2018    Eduardo Whitley    MRN: [id number]           Patient Information     Date Of Birth          1945        Visit Information        Provider Department      4/18/2018 11:00 AM Sylwia Patel PA [facility] and Inst for Prostate and Urologic Cancers        Today's Diagnoses     Malignant neoplasm of prostate (H)    -  1      Care Instructions    - Return in 3-4 months with a PSA and office visit  - OK to weight training - prefer more reps over heavier weights.     Kaci Patel PA-C          Follow-ups after your visit        Your next 10 appointments already scheduled     Jun 06, 2018  9:00 AM CDT   LAB with  LAB   [facility] ([facility])    [address] [place] [address]  [address] [facility] [address] [phone]           Please do not eat 10-12 hours before your appointment if you are coming in fasting for labs on lipids, cholesterol, or glucose (sugar). This does not apply to pregnant women. Water, hot tea and black coffee (with nothing added) are okay. Do not drink other fluids, diet soda or chew gum.            Jun 06, 2018 11:00 AM CDT   (Arrive by 10:45 AM)   Return Visit with CRISTAL Persaud [facility] and [facility] for Prostate and Urologic Cancers ([facility])    [address] [place] [address]  [address] [facility] [address] [phone]              Future tests that were ordered for you today     Open Future Orders        Priority Expected Expires Ordered    PSA tumor marker Routine 6/17/2018 9/15/2018 4/18/2018            Who to contact     Please call your clinic at [phone] to:    Ask questions about your health    Make or cancel appointments    Discuss your medicines    Learn about your test results    Speak to your doctor            Additional Information About Your Visit        MyChart Information     [employer] gives you secure access to your electronic health record. If you  "see a primary care provider, you can also send messages to your care team and make appointments. If you have questions, please call your primary care clinic.  If you do not have a primary care provider, please call [phone] and they will assist you.      The Editorialist is an electronic gateway that provides easy, online access to your medical records. With [employer], you can request a clinic appointment, read your test results, renew a prescription or communicate with your care team.     To access your existing account, please contact your [facility] or call [phone] for assistance.        Care EveryWhere ID     This is your Care EveryWhere ID. This could be used by other organizations to access your [place] medical records  [id number]        Your Vitals Were     Pulse Height BMI (Body Mass Index)             63 1.664 m (5' 5.5\") 23.76 kg/m2          Blood Pressure from Last 3 Encounters:   04/18/18 (!) 149/100   10/17/17 (!) 155/92   07/12/17 120/76    Weight from Last 3 Encounters:   04/18/18 65.8 kg (145 lb)   10/17/17 71.4 kg (157 lb 6.4 oz)   07/12/17 68 kg (150 lb)                 Today's Medication Changes          These changes are accurate as of 4/18/18 11:48 AM.  If you have any questions, ask your nurse or doctor.               Stop taking these medicines if you haven't already. Please contact your care team if you have questions.     tadalafil 5 MG tablet   Commonly known as:  CIALIS   Stopped by:  Sylwia Patel PA                    Primary Care Provider Office Phone # Fax #     [phone]       [address] [place] [id number]        Equal Access to Services     [facility] : Hadii hans Amos, waalanada luqadaha, qaybta kaalmada magaliyada, rafael swanson. So [facility] [phone].    ATENCIÓN: Si habla español, tiene a morrow disposición servicios gratuitos de asistencia lingüística. Llame " al [phone].    We comply with applicable federal civil rights laws and [place] laws. We do not discriminate on the basis of race, color, national origin, age, disability, sex, sexual orientation, or gender identity.            Thank you!     Thank you for choosing [facility] UROLOGY AND [facility] FOR PROSTATE AND UROLOGIC CANCERS  for your care. Our goal is always to provide you with excellent care. Hearing back from our patients is one way we can continue to improve our services. Please take a few minutes to complete the written survey that you may receive in the mail after your visit with us. Thank you!             Your Updated Medication List - Protect others around you: Learn how to safely use, store and throw away your medicines at www.disposemymeds.org.          This list is accurate as of 4/18/18 11:48 AM.  Always use your most recent med list.                   Brand Name Dispense Instructions for use Diagnosis    LOSARTAN POTASSIUM PO      Take 50 mg by mouth daily        Multiple vitamin Tabs      Take 1 tablet by mouth every evening

## 2018-04-18 NOTE — PATIENT INSTRUCTIONS
- Return in 3-4 months with a PSA and office visit  - OK to weight training - prefer more reps over heavier weights.     THOR CohnC
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
## 2018-04-18 NOTE — NURSING NOTE
"Chief Complaint   Patient presents with     RECHECK     Prostate cancer follow up with PSA       Initial Ht 1.664 m (5' 5.5\")  Wt 65.8 kg (145 lb)  BMI 23.76 kg/m2 Estimated body mass index is 23.76 kg/(m^2) as calculated from the following:    Height as of this encounter: 1.664 m (5' 5.5\").    Weight as of this encounter: 65.8 kg (145 lb).  Medication Reconciliation: complete     TIFFANY Cox    "

## 2018-05-30 ENCOUNTER — PRE VISIT (OUTPATIENT)
Dept: UROLOGY | Facility: CLINIC | Age: 73
End: 2018-05-30

## 2018-06-06 ENCOUNTER — OFFICE VISIT (OUTPATIENT)
Dept: UROLOGY | Facility: CLINIC | Age: 73
End: 2018-06-06
Payer: MEDICARE

## 2018-06-06 VITALS
SYSTOLIC BLOOD PRESSURE: 126 MMHG | HEART RATE: 69 BPM | BODY MASS INDEX: 24.27 KG/M2 | WEIGHT: 151 LBS | HEIGHT: 66 IN | DIASTOLIC BLOOD PRESSURE: 83 MMHG

## 2018-06-06 DIAGNOSIS — C61 MALIGNANT NEOPLASM OF PROSTATE (H): ICD-10-CM

## 2018-06-06 DIAGNOSIS — C61 MALIGNANT NEOPLASM OF PROSTATE (H): Primary | ICD-10-CM

## 2018-06-06 LAB — PSA SERPL-MCNC: <0.01 UG/L (ref 0–4)

## 2018-06-06 ASSESSMENT — PAIN SCALES - GENERAL: PAINLEVEL: NO PAIN (0)

## 2018-06-06 NOTE — MR AVS SNAPSHOT
After Visit Summary   6/6/2018    Eduardo Whitley    MRN: [id number]           Patient Information     Date Of Birth          1945        Visit Information        Provider Department      6/6/2018 11:00 AM Sylwia Patel PA [facility]        Today's Diagnoses     Malignant neoplasm of prostate (H)    -  1      Care Instructions    - Return in 3-4 months with a PSA and office visit  - Continue weight training - prefer more reps over heavier weights.   - Continue attempting intercourse  - Your dietary and exercise changes are excellent.  Keep up the good work.     Kaci Patel PA-C          Follow-ups after your visit        Your next 10 appointments already scheduled     Oct 09, 2018 10:00 AM CDT   LAB with  LAB   [facility] ([facility])    [address] [place] [id number] [phone]           Please do not eat 10-12 hours before your appointment if you are coming in fasting for labs on lipids, cholesterol, or glucose (sugar). This does not apply to pregnant women. Water, hot tea and black coffee (with nothing added) are okay. Do not drink other fluids, diet soda or chew gum.            Oct 09, 2018 11:00 AM CDT   (Arrive by 10:45 AM)   Return Visit with CRISTAL Persaud   [facility] ([facility])    [address] [place] [id number] [phone]              Future tests that were ordered for you today     Open Future Orders        Priority Expected Expires Ordered    PSA tumor marker Routine 8/20/2018 12/3/2018 6/6/2018            Who to contact     Please call your clinic at [phone] to:    Ask questions about your health    Make or cancel appointments    Discuss your medicines    Learn about your test results    Speak to your doctor            Additional Information About  "Your Visit        MyChart Information     [employer] gives you secure access to your electronic health record. If you see a primary care provider, you can also send messages to your care team and make appointments. If you have questions, please call your primary care clinic.  If you do not have a primary care provider, please call [phone] and they will assist you.      [employer] is an electronic gateway that provides easy, online access to your medical records. With [employer], you can request a clinic appointment, read your test results, renew a prescription or communicate with your care team.     To access your existing account, please contact your [facility] or call [phone] for assistance.        Care EveryWhere ID     This is your Care EveryWhere ID. This could be used by other organizations to access your [facility] medical records  [id number]        Your Vitals Were     Pulse Height BMI (Body Mass Index)             69 1.664 m (5' 5.5\") 24.75 kg/m2          Blood Pressure from Last 3 Encounters:   06/06/18 126/83   04/18/18 (!) 149/100   10/17/17 (!) 155/92    Weight from Last 3 Encounters:   06/06/18 68.5 kg (151 lb)   04/18/18 65.8 kg (145 lb)   10/17/17 71.4 kg (157 lb 6.4 oz)               Primary Care Provider Office Phone # Fax #    [facility] [phone]       [address] [place] [address]        Equal Access to Services     ALEXANDRIA DE SANTIAGO AH: Hadii hans ku hadasho Soomaali, waaxda luqadaha, qaybta kaalmada adeegyada, rafael ross . So [facility] [phone].    ATENCIÓN: Si habla español, tiene a morrow disposición servicios gratuitos de asistencia lingüística. Llame al [phone].    We comply with applicable federal civil rights laws and [place] laws. We do not discriminate on the basis of race, color, national origin, age, disability, sex, sexual orientation, or gender identity.            Thank you!     Thank " you for choosing [facility] AND [facility]  for your care. Our goal is always to provide you with excellent care. Hearing back from our patients is one way we can continue to improve our services. Please take a few minutes to complete the written survey that you may receive in the mail after your visit with us. Thank you!             Your Updated Medication List - Protect others around you: Learn how to safely use, store and throw away your medicines at www.disposemymeds.org.          This list is accurate as of 6/6/18 12:05 PM.  Always use your most recent med list.                   Brand Name Dispense Instructions for use Diagnosis    LOSARTAN POTASSIUM PO      Take 50 mg by mouth daily        Multiple vitamin Tabs      Take 1 tablet by mouth every evening

## 2018-06-06 NOTE — PROGRESS NOTES
"HPI: Mr. Eduardo Whitley is a 73 year old year old male presenting today for evaluation of chief complaint(s): RECHECK (Prostate cancer follow up with PSA.)    Mr. Whitley has PMH significant for HTN and Nabeel 9 CaP s/p RALP with BPLND 5/19/17 (12 months out) with Dr. Mathur, path showing: Nabeel 9 (4+5) adenoca with EPE and PNI.  There was no SVI, LVI and the nodes (0/6) and margins were negative, pT3N0.  His last Urology appt was 4/18/18 with me.    Today he presents in routine followup.  His PSA today is  <0.01ng/dL.  He is feeling great.  Has started working out again, and this is going pretty well.  He is focusing on REPS rather than heavy lifting.  Has had a double hernia in the past repaired with [facility], therefore is sensitive about avoiding recurrent hernias.      Continues to be satisfied with voiding symptoms.  No pads.  Can drive 3-4 hours without needing to use the toiletDid leak a few drops when he tripped once and had to catch himself to avoid falling.     AUA SS: 5 \"mostly satisfied\"  KESHIA: 15 today, improved from 12 in April.  He states he has been \"trying\" more often and thus is realizing success.      Current Outpatient Prescriptions   Medication Sig Dispense Refill     LOSARTAN POTASSIUM PO Take 50 mg by mouth daily       Multiple vitamin TABS Take 1 tablet by mouth every evening          ALLERGIES: Review of patient's allergies indicates no known allergies.      REVIEW OF SYSTEMS:  As above in HPI    GENERAL PHYSICAL EXAM:   Vitals: Ht 1.664 m (5' 5.5\")  Body mass index is 24.75 kg/(m^2).    GENERAL: Well groomed, well developed, well nourished male in NAD.  NEURO: Alert and oriented x 3.  PSYCH: Normal mood and affect, pleasant and agreeable during interview and exam.    RADIOLOGY: The following tests were reviewed:   Exam: Testicular ultrasound 3/9/2018 12:42 PM      Comparison: None available     History: Scrotal mass     Findings:  The right testicle measures 4.3 x 2.9 x 2.2 cm.  " There is normal  homogeneous echotexture throughout the right testicle without evidence  of intratesticular mass. Normal low-resistance arterial blood flow.  Right epididymal head measures 0.9 x 1.1 x 0.8 cm. There is a  well-circumscribed, anechoic cystic structure measuring 0.3 x 0.4 x  0.4 cm within the right epididymal head. Prominent epididymal  appendix.     The left testicle measures 3.8 x 3.0 x 2.3 cm.  There is normal  homogeneous echotexture throughout the left testicle without evidence  of intratesticular mass.  Normal low-resistance arterial blood flow.  There is a large septated cystic structure which appears to arise from  the left epididymal head and debris arising from the left epididymal  head measures 3.9 x 2.7 x 3.2 cm. Tubular ectasia of the rete testes.     There is no hydrocele or varicocele.  Impression:   1. No worrisome intratesticular mass.  2. Large cystic lesion with internal avascular dictations and debris  arising from left epididymal head measures up to 3.9 cm.   Internal  complexity and septation favor spermatocele (versus complex epididymal  head cyst.  3. Subcentimeter right epididymal head cyst/spermatocele.    LABS: The last test results for Mr. Eduardo Whitley were reviewed:  PSA -   Lab Results   Component Value Date    PSA <0.01 06/06/2018    PSA <0.01 03/09/2018     BMP -   Recent Labs   Lab Test  05/20/17   0729  05/19/17   1333  05/05/17   1601   NA  143  140   --    POTASSIUM  3.8  3.8  4.5   CHLORIDE  110*  104   --    CO2  25  27   --    BUN  16  20   --    CR  0.95  0.94   --    GLC  98  148*   --    PATRICIA  7.8*  8.2*   --        CBC -   Recent Labs   Lab Test  05/20/17   0729  05/19/17   1333  05/05/17   1601   WBC  9.0  14.5*  8.5   HGB  11.1*  13.1*  14.5   PLT  175  193  236       ASSESSMENT:   1) prostate cancer  2) ED  3) congenital hypospadias    PLAN:   - Return in October with a PSA and office visit.  THen his next visit will be in April 2019.  Wants to avoid  traveling in the winter.   - Continue weight training - prefer more reps over heavier weights.  Calcium, vitamin D, osteoporosis discussed.   - Continue attempting intercourse  - Your dietary and exercise changes are excellent.  Keep up the good work.     Kaci Patel PA-C  Department of Urologic Surgery

## 2018-06-06 NOTE — NURSING NOTE
Chief Complaint   Patient presents with     RECHECK     Prostate cancer follow up with PSA.     TIFFANY Cox

## 2018-06-06 NOTE — PATIENT INSTRUCTIONS
- Return in 3-4 months with a PSA and office visit  - Continue weight training - prefer more reps over heavier weights.   - Continue attempting intercourse  - Your dietary and exercise changes are excellent.  Keep up the good work.     Kaci Patel PA-C

## 2018-10-08 ENCOUNTER — PATIENT OUTREACH (OUTPATIENT)
Dept: CARE COORDINATION | Facility: CLINIC | Age: 73
End: 2018-10-08

## 2018-10-09 ENCOUNTER — OFFICE VISIT (OUTPATIENT)
Dept: UROLOGY | Facility: CLINIC | Age: 73
End: 2018-10-09
Payer: MEDICARE

## 2018-10-09 VITALS
SYSTOLIC BLOOD PRESSURE: 161 MMHG | HEIGHT: 66 IN | BODY MASS INDEX: 26.68 KG/M2 | DIASTOLIC BLOOD PRESSURE: 96 MMHG | HEART RATE: 62 BPM | WEIGHT: 166 LBS

## 2018-10-09 DIAGNOSIS — C61 MALIGNANT NEOPLASM OF PROSTATE (H): ICD-10-CM

## 2018-10-09 DIAGNOSIS — C61 MALIGNANT NEOPLASM OF PROSTATE (H): Primary | ICD-10-CM

## 2018-10-09 LAB — PSA SERPL-MCNC: <0.01 UG/L (ref 0–4)

## 2018-10-09 ASSESSMENT — PAIN SCALES - GENERAL: PAINLEVEL: NO PAIN (0)

## 2018-10-09 NOTE — PROGRESS NOTES
"HPI: Mr. Eduardo Whitley is a 73 year old year old male presenting today for evaluation of chief complaint(s): No chief complaint on file.    Mr. Whitley has PMH significant for HTN and Nabeel 9 CaP s/p RALP with BPLND 5/19/17 (12 months out) with Dr. Mathur, path showing: [place] 9 (4+5) adenoca with EPE and PNI.  There was no SVI, LVI and the nodes (0/6) and margins were negative, pT3N0.  His last Urology appt was 6/6//18 with me.  At that time his PSA was undetectable. He was wearing no pads.  His AUA SS was 5 \"mostly satisfied.\"  His ED was improving, and his KESHIA was 15.     Today he presents in routine followup. His PSA again is undetectable.  Urine control still good.  May leak if he walking a lot or exercising, or occasionally right before bed.  But no pads and hasn't worn any for at least 3 months.  States in general the leakage problem is \"97%\" better.    Empties fine.  Stream is good.  No dysuria, hematuria.   AUA SS 5 \" Mostly satisfied\"    Erections: No PDE5-Is.  Erectile function is \"decent.\"  Sufficient for intercourse.     No new PMH.  No new surgeries.   No new medications.   Is working with his PCP to titrate down his HTN medication.  Sts has \"white coat syndrome\" which explains his high bp today  No fevers, chills, back pain.   [place] right [place]       Current Outpatient Prescriptions   Medication Sig Dispense Refill     LOSARTAN POTASSIUM PO Take 50 mg by mouth daily       Multiple vitamin TABS Take 1 tablet by mouth every evening          ALLERGIES: Review of patient's allergies indicates no known allergies.      REVIEW OF SYSTEMS:  As above in HPI    GENERAL PHYSICAL EXAM:   Vitals: BP (!) 161/96  Pulse 62  Ht 1.664 m (5' 5.5\")  Wt 75.3 kg (166 lb)  BMI 27.2 kg/m2  Body mass index is 27.2 kg/(m^2).    GENERAL: Well groomed, well developed, well nourished male in NAD.  GI: Soft, NT, ND, no palpable masses.  Surgical incisions well healed- no " evidence of hernia.   CVAT bilaterally.  MS: Gait normal, normal muscle tone  SKIN: Warm to touch, dry.  No visible rashes or lesions on examined areas.  HEMATOLOGIC/LYMPHATIC/IMMUNOLOGIC: No LE edema.  NEURO: Alert and oriented x 3.  PSYCH: Normal mood and affect, pleasant and agreeable during interview and exam.    LABS: The last test results for Mr. Eduardo Whitley were reviewed:  PSA -   Lab Results   Component Value Date    PSA <0.01 06/06/2018    PSA <0.01 03/09/2018     BMP -   Recent Labs   Lab Test  05/20/17   0729  05/19/17   1333  05/05/17   1601   NA  143  140   --    POTASSIUM  3.8  3.8  4.5   CHLORIDE  110*  104   --    CO2  25  27   --    BUN  16  20   --    CR  0.95  0.94   --    GLC  98  148*   --    PATRICIA  7.8*  8.2*   --        CBC -   Recent Labs   Lab Test  05/20/17   0729  05/19/17   1333  05/05/17   1601   WBC  9.0  14.5*  8.5   HGB  11.1*  13.1*  14.5   PLT  175  193  236       ASSESSMENT:   1) prostate cancer  2) ED  3) congenital hypospadias    PLAN:   - Return in April with a PSA and office visit.   - Continue exercising and keeping with your clean diet.   - Is following with PCP regarding HTN    Kaci Patel PA-C  Department of Urologic Surgery

## 2018-10-09 NOTE — PATIENT INSTRUCTIONS
- Return in April with a PSA and office visit.   - Continue exercising and keeping with your clean diet.     Kaci Patel

## 2018-10-09 NOTE — MR AVS SNAPSHOT
After Visit Summary   10/9/2018    Eduardo Whitley    MRN: [id number]           Patient Information     Date Of Birth          1945        Visit Information        Provider Department      10/9/2018 11:00 AM Sylwia Patel PA [facility] and [facility]        Today's Diagnoses     Malignant neoplasm of prostate (H)    -  1      Care Instructions    - Return in April with a PSA and office visit.   - Continue exercising and keeping with your clean diet.   Kaci Patel            Follow-ups after your visit        Future tests that were ordered for you today     Open Future Orders        Priority Expected Expires Ordered    PSA tumor marker Routine 2/26/2019 6/16/2019 10/9/2018            Who to contact     Please call your clinic at [phone] to:    Ask questions about your health    Make or cancel appointments    Discuss your medicines    Learn about your test results    Speak to your doctor            Additional Information About Your Visit        MyChart Information     [employer] gives you secure access to your electronic health record. If you see a primary care provider, you can also send messages to your care team and make appointments. If you have questions, please call your primary care clinic.  If you do not have a primary care provider, please call [phone] and they will assist you.      [employer] is an electronic gateway that provides easy, online access to your medical records. With [employer], you can request a clinic appointment, read your test results, renew a prescription or communicate with your care team.     To access your existing account, please contact your [facility] or call [phone] for assistance.        Care EveryWhere ID     This is your Care EveryWhere ID. This could be used by other organizations to access your [place] medical records  [id number]        Your Vitals Were     Pulse Height BMI (Body  "Mass Index)             62 1.664 m (5' 5.5\") 27.2 kg/m2          Blood Pressure from Last 3 Encounters:   10/09/18 (!) 161/96   06/06/18 126/83   04/18/18 (!) 149/100    Weight from Last 3 Encounters:   10/09/18 75.3 kg (166 lb)   06/06/18 68.5 kg (151 lb)   04/18/18 65.8 kg (145 lb)               Primary Care Provider Office Phone # Fax #     [phone]       [address] [place] [address]        Equal Access to Services     ALEXANDRIA DE SANTIAGO : Hadii hans Amos, waaxda luqadaha, qaybta kaalmada ademichelleda, rafael swanson. So [facility] [phone].    ATENCIÓN: Si habla español, tiene a morrow disposición servicios gratuitos de asistencia lingüística. [facility] [phone].    We comply with applicable federal civil rights laws and [place] laws. We do not discriminate on the basis of race, color, national origin, age, disability, sex, sexual orientation, or gender identity.            Thank you!     Thank you for choosing [facility]OLOG[facility] AND [facility]  for your care. Our goal is always to provide you with excellent care. Hearing back from our patients is one way we can continue to improve our services. Please take a few minutes to complete the written survey that you may receive in the mail after your visit with us. Thank you!             Your Updated Medication List - Protect others around you: Learn how to safely use, store and throw away your medicines at www.disposemymeds.org.          This list is accurate as of 10/9/18 11:41 AM.  Always use your most recent med list.                   Brand Name Dispense Instructions for use Diagnosis    LOSARTAN POTASSIUM PO      Take 50 mg by mouth daily        Multiple vitamin Tabs      Take 1 tablet by mouth every evening          "

## 2018-10-09 NOTE — NURSING NOTE
"Chief Complaint   Patient presents with     RECHECK     PSA check       Blood pressure (!) 161/96, pulse 62, height 1.664 m (5' 5.5\"), weight 75.3 kg (166 lb). Body mass index is 27.2 kg/(m^2).    Patient Active Problem List   Diagnosis     Elevated prostate specific antigen (PSA)     S/P prostatectomy     Actinic keratoses     Essential hypertension     Hypospadias     Other enthesopathy of ankle and tarsus     Palpable lymph node     Primary prostate cancer (H)     Seborrheic keratoses       No Known Allergies    Current Outpatient Prescriptions   Medication Sig Dispense Refill     LOSARTAN POTASSIUM PO Take 50 mg by mouth daily       Multiple vitamin TABS Take 1 tablet by mouth every evening          Social History   Substance Use Topics     Smoking status: Never Smoker     Smokeless tobacco: Never Used     Alcohol use No       TIFFANY Perez  10/9/2018  11:20 AM       "

## 2019-04-08 ENCOUNTER — PRE VISIT (OUTPATIENT)
Dept: UROLOGY | Facility: CLINIC | Age: 74
End: 2019-04-08

## 2019-04-08 NOTE — TELEPHONE ENCOUNTER
Chief Complaint : Return-6 Mo     New Hx/Sx: Prostate Cancer/Elevated PSA    Records/Orders/Proced: PSA on 4/9    Pt Contacted: Not Needed    At Rooming: Normal

## 2019-04-09 ENCOUNTER — OFFICE VISIT (OUTPATIENT)
Dept: UROLOGY | Facility: CLINIC | Age: 74
End: 2019-04-09
Payer: MEDICARE

## 2019-04-09 VITALS
HEIGHT: 66 IN | HEART RATE: 69 BPM | SYSTOLIC BLOOD PRESSURE: 140 MMHG | BODY MASS INDEX: 24.59 KG/M2 | WEIGHT: 153 LBS | DIASTOLIC BLOOD PRESSURE: 86 MMHG

## 2019-04-09 DIAGNOSIS — C61 MALIGNANT NEOPLASM OF PROSTATE (H): Primary | ICD-10-CM

## 2019-04-09 DIAGNOSIS — C61 MALIGNANT NEOPLASM OF PROSTATE (H): ICD-10-CM

## 2019-04-09 LAB — PSA SERPL-MCNC: <0.01 UG/L (ref 0–4)

## 2019-04-09 RX ORDER — WARFARIN SODIUM 2.5 MG/1
TABLET ORAL
Refills: 4 | COMMUNITY
Start: 2019-02-08 | End: 2021-04-27

## 2019-04-09 RX ORDER — ATORVASTATIN CALCIUM 40 MG/1
40 TABLET, FILM COATED ORAL
COMMUNITY
Start: 2019-01-06 | End: 2020-01-17

## 2019-04-09 RX ORDER — LOSARTAN POTASSIUM 50 MG/1
50 TABLET ORAL
COMMUNITY
Start: 2018-09-27 | End: 2019-10-02

## 2019-04-09 RX ORDER — AMLODIPINE BESYLATE 5 MG/1
2.5 TABLET ORAL
COMMUNITY
Start: 2019-01-21 | End: 2021-09-30

## 2019-04-09 ASSESSMENT — MIFFLIN-ST. JEOR: SCORE: 1376.75

## 2019-04-09 ASSESSMENT — PAIN SCALES - GENERAL: PAINLEVEL: NO PAIN (0)

## 2019-04-09 NOTE — PROGRESS NOTES
"HPI: Mr. Eduardo Whitley is a 74 year old year old male presenting today for evaluation of chief complaint(s): RECHECK (Return-6 Mo)    Mr. Whitley has PMH significant for HTN and [place] 9 CaP s/p RALP with BPLND 5/19/17 (12 months out) with Dr. Mathur, path showing: Nabeel 9 (4+5) adenoca with EPE and PNI.  There was no SVI, LVI and the nodes (0/6) and margins were negative, pT3N0.  His last Urology appt was 10/9/18 with me.  At that time his PSA was undetectable. He was wearing no pads.  His AUA SS was 5 \"mostly satisfied.\"  His ED was improving and he described his fxn as \"decent\" and sufficient for intercourse, and he was using no PDE-5I.     Today he presents in routine followup. His PSA is UNDETECTABLE (<0.01ng/dL) today.    - Urine control: Incontinence is further improved - may leak very rarely if he is doing strenuous activity for a period of time near the end of the day.   Empties fine.  Stream is good.  No dysuria, hematuria. AUA SS today 5 \"Pleased\"  - Erectile fxn: Can get erections, but this hasn't been a large focus for him.  KESHIA today 18    ROS:  1/4/19 - did have an right ICA dissection associated with small stroke - managed at [facility].  He was started on warfarin for this and is being followed with serial CTs to check for residual.  His next CT is in May and may stop the warfarin if CT shows complete healing at that time. No residual deficits s/p CVA.  Has been eating a Mediterranean Diet to optimize nutrition (and heal more quickly). Trying to get more sleep.  Is on an activity restriction - no lifting >10lbs. Exercising as he can.     Current Outpatient Medications   Medication Sig Dispense Refill     LOSARTAN POTASSIUM PO Take 50 mg by mouth daily       Multiple vitamin TABS Take 1 tablet by mouth every evening          ALLERGIES: Patient has no known allergies.      REVIEW OF SYSTEMS:  As above in HPI    GENERAL PHYSICAL EXAM:   Vitals: /86   Pulse 69   Ht 1.676 m (5' 6\")   " Wt 69.4 kg (153 lb)   BMI 24.69 kg/m    Body mass index is 24.69 kg/m .    GENERAL: Well groomed, well developed, well nourished male in NAD.  GI: Soft, NT, ND, no palpable masses.    No CVAT bilaterally.  MS: Gait normal, normal muscle tone  SKIN: Warm to touch, dry.  No visible rashes or lesions on examined areas.  NEURO: Alert and oriented x 3.  PSYCH: Normal mood and affect, pleasant and agreeable during interview and exam.    RADIOLOGY: The following tests were reviewed:   None recent relevant    LABS: The last test results for Mr. Eduardo Whitley were reviewed:  PSA -   Lab Results   Component Value Date    PSA <0.01 10/09/2018    PSA <0.01 06/06/2018    PSA <0.01 03/09/2018     BMP -   Recent Labs   Lab Test 05/20/17  0729 05/19/17  1333 05/05/17  1601    140  --    POTASSIUM 3.8 3.8 4.5   CHLORIDE 110* 104  --    CO2 25 27  --    BUN 16 20  --    CR 0.95 0.94  --    GLC 98 148*  --    PATRICIA 7.8* 8.2*  --        CBC -   Recent Labs   Lab Test 05/20/17  0729 05/19/17  1333 05/05/17  1601   WBC 9.0 14.5* 8.5   HGB 11.1* 13.1* 14.5    193 236       ASSESSMENT:   1) Prostate cancer  2) MONSERRAT  3) Erectile dysfunction  4) Recent right ICA dissection, now on warfarin at least through 5/2019    PLAN:   - Complete aruna and AUA SS   - Return 6 months with PSA    Kaci Patel PA-C  Department of Urologic Surgery

## 2019-04-09 NOTE — PATIENT INSTRUCTIONS
- Return in 6 months with a PSA first  - Continue your healthy diet and exercise    CRIST[facility]

## 2019-09-25 ENCOUNTER — PRE VISIT (OUTPATIENT)
Dept: UROLOGY | Facility: CLINIC | Age: 74
End: 2019-09-25

## 2019-09-25 NOTE — TELEPHONE ENCOUNTER
Chief Complaint : PSA check     Records/Orders: PSA is getting don before appointment     Pt Contacted: no    At Rooming: normal

## 2019-10-01 ENCOUNTER — OFFICE VISIT (OUTPATIENT)
Dept: UROLOGY | Facility: CLINIC | Age: 74
End: 2019-10-01
Payer: MEDICARE

## 2019-10-01 VITALS
SYSTOLIC BLOOD PRESSURE: 134 MMHG | HEART RATE: 61 BPM | BODY MASS INDEX: 24.59 KG/M2 | WEIGHT: 153 LBS | DIASTOLIC BLOOD PRESSURE: 75 MMHG | HEIGHT: 66 IN

## 2019-10-01 DIAGNOSIS — C61 MALIGNANT NEOPLASM OF PROSTATE (H): ICD-10-CM

## 2019-10-01 DIAGNOSIS — C61 MALIGNANT NEOPLASM OF PROSTATE (H): Primary | ICD-10-CM

## 2019-10-01 LAB — PSA SERPL-MCNC: <0.01 UG/L (ref 0–4)

## 2019-10-01 ASSESSMENT — PAIN SCALES - GENERAL: PAINLEVEL: NO PAIN (0)

## 2019-10-01 ASSESSMENT — MIFFLIN-ST. JEOR: SCORE: 1376.75

## 2019-10-01 NOTE — PROGRESS NOTES
"HPI: Mr. Eduardo Whitley is a 74 year old year old male presenting today for evaluation of chief complaint(s): No chief complaint on file.    Mr. Whitley has PMH significant for HTN and [place] 9 CaP s/p RALP with BPLND 5/19/17 (29 months out) with Dr. Mathur, path showing: [place] 9 (4+5) adenoca with EPE and PNI.  There was no SVI, LVI and the nodes (0/6) and margins were negative, pT3N0.  Decipher 0.55.    His last Urology appt was 4/9/19 with me.  At that time his PSA was undetectable. He was wearing no pads.  He had rare leaks with doing strenuous activity at the end of the day. His AUA SS was 5 \"pleased.\"  His KESHIA was 18 without use of PDE5I, and erections were sufficient although not of big concern for him. He was just recovering from a  right ICA dissection associated with small stroke with no residual defecits- managed at [facility] in 1/2019 - he required warfarin for 3 months but now no longer needs.      Today he returns in routine followup with PSA remaining undetectable.   KESHIA:  20 - no PDE5Is  AUA: 1 \" pleased\".  No pads.     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 As a result has lightened up on exercises.  Walks vigorously but doesn't do as much heavy lifting  Is writing a book.   Eats very healthy   - Has h/o BL inguinal herniorrhaphies with mesh- now sees a R groin bulge and wants this checked  - Has many Moles - followed by dermatologist - no recent malignancies    Current Outpatient Medications   Medication Sig Dispense Refill     amLODIPine (NORVASC) 5 MG tablet Take 2.5 mg by mouth       atorvastatin (LIPITOR) 40 MG tablet Take 40 mg by mouth       losartan (COZAAR) 50 MG tablet Take 50 mg by mouth       LOSARTAN POTASSIUM PO Take 50 mg by mouth daily       Multiple vitamin TABS Take 1 tablet by mouth every evening        warfarin (COUMADIN) 2.5 MG tablet ANTICOAGULATION CLINIC MANAGED PT TAKE AS DIRECTED. CALL [phone] WITH ?S  4       ALLERGIES: Patient has " "no known allergies.      REVIEW OF SYSTEMS:  As above in HPI    GENERAL PHYSICAL EXAM:   Vitals: /75   Pulse 61   Ht 1.676 m (5' 6\")   Wt 69.4 kg (153 lb)   BMI 24.69 kg/m    Body mass index is 24.69 kg/m .    GENERAL: Well groomed, well developed, well nourished male in NAD.  GI: Soft, NT, ND, no palpable masses.    Incisions well healed without hernias or tenderness.   GROIN:  No hernias, no tenderness, no masses.  (R groin bulge likely redundant skin following herniorrhaphy)  MS: Gait normal, normal muscle tone  SKIN: Warm to touch, dry.  No visible rashes or lesions on examined areas.  HEMATOLOGIC/LYMPHATIC/IMMUNOLOGIC: No LE edema.  NEURO: Alert and oriented x 3.  PSYCH: Normal mood and affect, pleasant and agreeable during interview and exam.    LABS: The last test results for Mr. Eduardo Whitley were reviewed:  PSA -   Lab Results   Component Value Date    PSA <0.01 04/09/2019    PSA <0.01 10/09/2018    PSA <0.01 06/06/2018    PSA <0.01 03/09/2018     BMP -   Recent Labs   Lab Test 05/20/17  0729 05/19/17  1333 05/05/17  1601    140  --    POTASSIUM 3.8 3.8 4.5   CHLORIDE 110* 104  --    CO2 25 27  --    BUN 16 20  --    CR 0.95 0.94  --    GLC 98 148*  --    PATRICIA 7.8* 8.2*  --        CBC -   Recent Labs   Lab Test 05/20/17  0729 05/19/17  1333 05/05/17  1601   WBC 9.0 14.5* 8.5   HGB 11.1* 13.1* 14.5    193 236       ASSESSMENT:   1) prostate cancer    PLAN:   - Return 6 months with a PSA first    Kaci Patel PA-C  Department of Urologic Surgery    "

## 2019-10-01 NOTE — Clinical Note
"10/1/2019       RE: Eduardo Whitley  [address]  [facility] [address]     Dear Colleague,    Thank you for referring your patient, Eduardo Whitley, to the [facility] UROLOGY AND INST FOR PROSTATE AND UROLOGIC CANCERS at [facility]. Please see a copy of my visit note below.    HPI: Mr. Eduardo Whitley is a 74 year old year old male presenting today for evaluation of chief complaint(s): No chief complaint on file.    Mr. Whitley has PMH significant for HTN and [place] 9 CaP s/p RALP with BPLND 5/19/17 (29 months out) with Dr. Mathur, path showing: [place] 9 (4+5) adenoca with EPE and PNI.  There was no SVI, LVI and the nodes (0/6) and margins were negative, pT3N0.  Decipher 0.55.    His last Urology appt was 4/9/19 with me.  At that time his PSA was undetectable. He was wearing no pads.  He had rare leaks with doing strenuous activity at the end of the day. His AUA SS was 5 \"pleased.\"  His KESHIA was 18 without use of PDE5I, and erections were sufficient although not of big concern for him. He was just recovering from a  right ICA dissection associated with small stroke with no residual defecits- managed at [facility] in 1/2019 - he required warfarin for 3 months but now no longer needs.      Today he returns in routine followup with PSA remaining undetectable.   KESHIA:  20 - no PDE5Is  AUA: 1 \" pleased\".  No pads.     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 As a result has lightened up on exercises.  Walks vigorously but doesn't do as much heavy lifting  Is writing a book.   Eats very healthy   - Has h/o BL inguinal herniorrhaphies with mesh- now sees a R groin bulge and wants this checked  - Has many Moles - followed by dermatologist - no recent malignancies    Current Outpatient Medications   Medication Sig Dispense Refill     amLODIPine (NORVASC) 5 MG tablet Take 2.5 mg by mouth       atorvastatin (LIPITOR) 40 MG tablet Take 40 mg by mouth       " "losartan (COZAAR) 50 MG tablet Take 50 mg by mouth       LOSARTAN POTASSIUM PO Take 50 mg by mouth daily       Multiple vitamin TABS Take 1 tablet by mouth every evening        warfarin (COUMADIN) 2.5 MG tablet ANTICOAGULATION CLINIC MANAGED PT TAKE AS DIRECTED. CALL [phone] WITH ?S  4       ALLERGIES: Patient has no known allergies.      REVIEW OF SYSTEMS:  As above in HPI    GENERAL PHYSICAL EXAM:   Vitals: /75   Pulse 61   Ht 1.676 m (5' 6\")   Wt 69.4 kg (153 lb)   BMI 24.69 kg/m     Body mass index is 24.69 kg/m .    GENERAL: Well groomed, well developed, well nourished male in NAD.  GI: Soft, NT, ND, no palpable masses.    Incisions well healed without hernias or tenderness.   GROIN:  No hernias, no tenderness, no masses.  (R groin bulge likely redundant skin following herniorrhaphy)  MS: Gait normal, normal muscle tone  SKIN: Warm to touch, dry.  No visible rashes or lesions on examined areas.  HEMATOLOGIC/LYMPHATIC/IMMUNOLOGIC: No LE edema.  NEURO: Alert and oriented x 3.  PSYCH: Normal mood and affect, pleasant and agreeable during interview and exam.    LABS: The last test results for Mr. Eduardo Whitley were reviewed:  PSA -   Lab Results   Component Value Date    PSA <0.01 04/09/2019    PSA <0.01 10/09/2018    PSA <0.01 06/06/2018    PSA <0.01 03/09/2018     BMP -   Recent Labs   Lab Test 05/20/17  0729 05/19/17  1333 05/05/17  1601    140  --    POTASSIUM 3.8 3.8 4.5   CHLORIDE 110* 104  --    CO2 25 27  --    BUN 16 20  --    CR 0.95 0.94  --    GLC 98 148*  --    PATRICIA 7.8* 8.2*  --        CBC -   Recent Labs   Lab Test 05/20/17  0729 05/19/17  1333 05/05/17  1601   WBC 9.0 14.5* 8.5   HGB 11.1* 13.1* 14.5    193 236       ASSESSMENT:   1) prostate cancer    PLAN:   - Return 6 months with a PSA first    Kaci Patel PA-C  Department of Urologic Surgery      Again, thank you for allowing me to participate in the care of your patient.      Sincerely,    Sylwia Patel, " PA

## 2020-03-10 ENCOUNTER — HEALTH MAINTENANCE LETTER (OUTPATIENT)
Age: 75
End: 2020-03-10

## 2020-04-10 ENCOUNTER — TELEPHONE (OUTPATIENT)
Dept: UROLOGY | Facility: CLINIC | Age: 75
End: 2020-04-10

## 2020-04-10 NOTE — TELEPHONE ENCOUNTER
Spoke with patient to convert their upcoming visit with Kaci Patel PA-C to a Telephone Visit. Patient declined Video Visit. Patient will get their PSA drawn at their local clinic. Orders for PSA will be faxed over.      Shirley Keys EMT

## 2020-04-20 ENCOUNTER — PRE VISIT (OUTPATIENT)
Dept: UROLOGY | Facility: CLINIC | Age: 75
End: 2020-04-20

## 2020-04-20 NOTE — PROGRESS NOTES
"Eduardo Whitley is a 75 year old male who is being evaluated via a billable telephone visit.      The patient has been notified of following:     \"This telephone visit will be conducted via a call between you and your physician/provider. We have found that certain health care needs can be provided without the need for a physical exam.  This service lets us provide the care you need with a short phone conversation.  If a prescription is necessary we can send it directly to your pharmacy.  If lab work is needed we can place an order for that and you can then stop by our lab to have the test done at a later time.    Telephone visits are billed at different rates depending on your insurance coverage. During this emergency period, for some insurers they may be billed the same as an in-person visit.  Please reach out to your insurance provider with any questions.    If during the course of the call the physician/provider feels a telephone visit is not appropriate, you will not be charged for this service.\"    Patient has given verbal consent for Telephone visit?  Yes    How would you like to obtain your AVS? MyChart    HPI: Mr. Eduardo Whitley is a 75 year old year old male presenting today for evaluation of chief complaint(s): Telemedicine consult (Hx/Sx: Prostate cancer (s/p RALP with BPLND 5/19/17))    Mr. Whitley has PMH significant for HTN and [place] 9 CaP s/p RALP with BPLND 5/19/17 (3 years out) with Dr. Mathur, path showing: [place] 9 (4+5) adenoca with EPE and PNI.  There was no SVI, LVI and the nodes (0/6) and margins were negative, pT3N0.  Decipher 0.55.     His last Urology appt was 10/1/19 with me.  At that time his PSA was undetectable. He was wearing no pads and his AUA SS was 1 \"pleased.\"  His KESHIA was 20 and he was needing no PDE5Is.      Today he returns for a phone appt.  He was not able to get his PSA checked- if he would have come to [place] to see me, [place] would have mandated that he quarantine " for 14 days.      His concerns:  - Wants to see me in person once COVID improves, perhaps at the END of July.  He will check PSA at that time too.    - Continues to keep a very healthy diet.   - Urine control continues to be great  - Sexual function is good.   - Has a cyst in the right upper thigh/ groin.  Saw a provider that told him it was not concerning.   - Has fully recovered from the stroke/ aneurysm incident.  Had been on warfarin for a period of time but the aneurysm healed within 3 months and he was asked to stop the warfarin.   - Still does very smart exercises (no heavy lifting)    Current Outpatient Medications   Medication Sig Dispense Refill     amLODIPine (NORVASC) 5 MG tablet Take 2.5 mg by mouth       atorvastatin (LIPITOR) 40 MG tablet Take 40 mg by mouth       losartan (COZAAR) 50 MG tablet Take 50 mg by mouth       LOSARTAN POTASSIUM PO Take 50 mg by mouth daily       Multiple vitamin TABS Take 1 tablet by mouth every evening        warfarin (COUMADIN) 2.5 MG tablet ANTICOAGULATION CLINIC MANAGED PT TAKE AS DIRECTED. CALL [phone] WITH ?S  4       ALLERGIES: Patient has no known allergies.      REVIEW OF SYSTEMS:  As above in HPI    GENERAL PHYSICAL EXAM:   Vitals: There were no vitals taken for this visit.  There is no height or weight on file to calculate BMI.    PSYCH: Normal mood and affect, pleasant and agreeable during interview and exam.    LABS: The last test results for Mr. Eduardo Whitley were reviewed:  PSA -   Lab Results   Component Value Date    PSA <0.01 10/01/2019    PSA <0.01 04/09/2019    PSA <0.01 10/09/2018    PSA <0.01 06/06/2018    PSA <0.01 03/09/2018     BMP -   Recent Labs   Lab Test 05/20/17  0729 05/19/17  1333 05/05/17  1601    140  --    POTASSIUM 3.8 3.8 4.5   CHLORIDE 110* 104  --    CO2 25 27  --    BUN 16 20  --    CR 0.95 0.94  --    GLC 98 148*  --    PATRICIA 7.8* 8.2*  --        CBC -   Recent Labs   Lab Test 05/20/17  0729 05/19/17  1333 05/05/17  1601    WBC 9.0 14.5* 8.5   HGB 11.1* 13.1* 14.5    193 236       ASSESSMENT:   1) prostate cancer    PLAN:   - Return in 3 months for a visit in person (at patient's request).  Will get a PSA at that time.     - Telephone Call: 14 minutes    Kaci Patel PA-C  Department of Urologic Surgery

## 2020-04-20 NOTE — TELEPHONE ENCOUNTER
Chief Complaint : Follow up - 6 Months    Hx/Sx: Prostate cancer (s/p RALP with BPLND 5/19/17)    Records/Orders: PSA scheduled    Pt Contacted: N/a    At Rooming: Carter Morales, EMT

## 2020-04-21 ENCOUNTER — VIRTUAL VISIT (OUTPATIENT)
Dept: UROLOGY | Facility: CLINIC | Age: 75
End: 2020-04-21
Payer: MEDICARE

## 2020-04-21 DIAGNOSIS — C61 MALIGNANT NEOPLASM OF PROSTATE (H): Primary | ICD-10-CM

## 2020-04-21 RX ORDER — ATORVASTATIN CALCIUM 20 MG/1
TABLET, FILM COATED ORAL
COMMUNITY
Start: 2019-06-17 | End: 2020-06-21

## 2020-04-21 NOTE — NURSING NOTE
He has NOT had a recent PSA drawn, and [facility] [place] [facility] not [facility] due [facility].    Eduardo Whitley has given verbal permission for a phone visit 04/21/20 10:29 AM and would like to be reached at # [phone]     HECTOR Marie        Called the pt 04/21/20, and 10:29 AM and reviewed their medications, history, and allergies. I then asked that they be in a quiet location with limed distractions so they can hear the provider well.    Chief Complaint   Patient presents with     Telemedicine consult     Hx/Sx: Prostate cancer (s/p RALP with BPLND 5/19/17)       There were no vitals taken for this visit. There is no height or weight on file to calculate BMI.    Patient Active Problem List   Diagnosis     Elevated prostate specific antigen (PSA)     S/P prostatectomy     Actinic keratoses     Essential hypertension     Hypospadias     Other enthesopathy of ankle and tarsus     Palpable lymph node     Primary prostate cancer (H)     Seborrheic keratoses       No Known Allergies    Current Outpatient Medications   Medication Sig Dispense Refill     atorvastatin (LIPITOR) 20 MG tablet Take 20 mg by mouth       LOSARTAN POTASSIUM PO Take 50 mg by mouth daily       Multiple vitamin TABS Take 1 tablet by mouth every evening        amLODIPine (NORVASC) 5 MG tablet Take 2.5 mg by mouth       atorvastatin (LIPITOR) 40 MG tablet Take 40 mg by mouth       losartan (COZAAR) 50 MG tablet Take 50 mg by mouth       warfarin (COUMADIN) 2.5 MG tablet ANTICOAGULATION CLINIC MANAGED PT TAKE AS DIRECTED. CALL [phone] WITH ?S  4       Social History     Tobacco Use     Smoking status: Never Smoker     Smokeless tobacco: Never Used   Substance Use Topics     Alcohol use: No     Drug use: None       HECTOR Marie,  4/21/2020  10:29 AM

## 2020-07-15 ENCOUNTER — PRE VISIT (OUTPATIENT)
Dept: UROLOGY | Facility: CLINIC | Age: 75
End: 2020-07-15

## 2020-07-15 NOTE — TELEPHONE ENCOUNTER
Chief Complaint : Follow up - 3 Months    Hx/Sx: Prostate cancer (s/p RALP w/ BPLND 5/19/17)    Records/Orders: PSA scheduled    Pt Contacted: N/a    At Rooming: CODIE/KESHIA Morales, EMT

## 2020-07-21 ENCOUNTER — OFFICE VISIT (OUTPATIENT)
Dept: UROLOGY | Facility: CLINIC | Age: 75
End: 2020-07-21
Payer: MEDICARE

## 2020-07-21 VITALS — HEIGHT: 66 IN | WEIGHT: 145 LBS | BODY MASS INDEX: 23.3 KG/M2

## 2020-07-21 DIAGNOSIS — C61 MALIGNANT NEOPLASM OF PROSTATE (H): Primary | ICD-10-CM

## 2020-07-21 DIAGNOSIS — C61 MALIGNANT NEOPLASM OF PROSTATE (H): ICD-10-CM

## 2020-07-21 LAB — PSA SERPL-MCNC: <0.01 UG/L (ref 0–4)

## 2020-07-21 ASSESSMENT — PAIN SCALES - GENERAL: PAINLEVEL: NO PAIN (0)

## 2020-07-21 ASSESSMENT — MIFFLIN-ST. JEOR: SCORE: 1335.47

## 2020-07-21 NOTE — NURSING NOTE
Chief Complaint   Patient presents with     RECHECK     Follow up - 3 Months       Catina Lopez MA

## 2020-07-21 NOTE — Clinical Note
"7/21/2020       RE: Eduardo Whitley  [address] [facility] [address]     Dear Colleague,    Thank you for referring your patient, Eduardo Whitley, to the [facility] UROLOGY AND INST FOR PROSTATE AND UROLOGIC CANCERS at [facility]. Please see a copy of my visit note below.    HPI: Mr. Eduardo Whitley is a 75 year old year old male presenting today for evaluation of chief complaint(s): RECHECK (Follow up - 3 Months)    Mr. Whitley has PMH significant for HTN, HLD and [place]abe[place] 9 CaP s/p RALP with BPLND 5/19/17 (3 years out) with Dr. Mathur, path showing: [place] 9 (4+5) adenoca with EPE and PNI.  There was no SVI, LVI and the nodes (0/6) and margins were negative, pT3N0.  Decipher 0.55.     His last Urology appt was a virtual visit on 04/21/20 with me.  At that time his PSA was unable to be checked due to COVID.  He had no voiding concerns and reported his sexual function was good.     Today he returns in routine followup. His PSA is undetectable (7/21/20 <0.01ng/dL).   Has been healthy  Eats well.  Exercises regularly.    URINARY SYMPTOMS:  \"pleased\"  SEXUAL:  Doesn't need Viagra.  Pleased with sexual function.      Has a soft fleshy mass in the right groin.  Evaluated in the [place] ED in 12/2019 and a CT was performed = lipoma.  Patient wonders about followup.  He doesn't think it is growing. No pain.     Current Outpatient Medications   Medication Sig Dispense Refill     amLODIPine (NORVASC) 5 MG tablet Take 2.5 mg by mouth       atorvastatin (LIPITOR) 20 MG tablet Take 20 mg by mouth       atorvastatin (LIPITOR) 40 MG tablet Take 40 mg by mouth       losartan (COZAAR) 50 MG tablet Take 50 mg by mouth       LOSARTAN POTASSIUM PO Take 50 mg by mouth daily       Multiple vitamin TABS Take 1 tablet by mouth every evening        warfarin (COUMADIN) 2.5 MG tablet ANTICOAGULATION CLINIC MANAGED PT TAKE AS DIRECTED. CALL [phone] WITH ?S  4       ALLERGIES: Patient has no known " "allergies.      REVIEW OF SYSTEMS:  As above in HPI    GENERAL PHYSICAL EXAM:   Vitals: Ht 1.676 m (5' 6\")   Wt 65.8 kg (145 lb)   BMI 23.40 kg/m    Body mass index is 23.4 kg/m .    GENERAL: Well groomed, well developed, well nourished male in NAD.  MS: Gait normal, normal muscle tone  Palpable soft mass in the right inner thigh  NEURO: Alert and oriented x 3.  PSYCH: Normal mood and affect, pleasant and agreeable during interview and exam.     :      Inguinal: no hernias or palpable lymph nodes      Circumcised penis, no penile plaques or lesions. Orthotopic location of the urethral meatus.       Scrotum normal.      Testicles of normal firmness and consistency, no masses.     RADIOLOGY: The following tests were reviewed:   3/2018 - Scrotal US:  Impression:   1. No worrisome intratesticular mass.  2. Large cystic lesion with internal avascular dictations and debris  arising from left epididymal head measures up to 3.9 cm.   Internal  complexity and septation favor spermatocele (versus complex epididymal  head cyst.  3. Subcentimeter right epididymal head cyst/spermatocele.     11/27/2019 - CT AP with contrast:  Impression:   1. No worrisome intratesticular mass.  2. Large cystic lesion with internal avascular dictations and debris  arising from left epididymal head measures up to 3.9 cm.   Internal  complexity and septation favor spermatocele (versus complex epididymal  head cyst.  3. Subcentimeter right epididymal head cyst/spermatocele.       LABS: The last test results for Mr. Eduardo Whitley were reviewed:  PSA -   Lab Results   Component Value Date    PSA <0.01 07/21/2020    PSA <0.01 10/01/2019    PSA <0.01 04/09/2019    PSA <0.01 10/09/2018    PSA <0.01 06/06/2018    PSA <0.01 03/09/2018     BMP -   Recent Labs   Lab Test 05/20/17  0729 05/19/17  1333 05/05/17  1601    140  --    POTASSIUM 3.8 3.8 4.5   CHLORIDE 110* 104  --    CO2 25 27  --    BUN 16 20  --    CR 0.95 0.94  --    GLC 98 148*  --  "   PATRICIA 7.8* 8.2*  --        CBC -   Recent Labs   Lab Test 05/20/17  0729 05/19/17  1333 05/05/17  1601   WBC 9.0 14.5* 8.5   HGB 11.1* 13.1* 14.5    193 236       ASSESSMENT:   1) Prostate cancer  2) right iliopsoas muscle lipoma    PLAN:   - Return in 9 months (April 2021) with a PSA first.  Patient prefers avoiding January weather.   - Follow up with your PCP regarding the intramuscular lipoma in the distal aspect of the right iliopsoas muscle (which is exerting mild mass effect on the femoral vessels)    Kaci Patel PA-C  Department of Urologic Surgery    Again, thank you for allowing me to participate in the care of your patient.      Sincerely,    CRISTAL Adames

## 2020-07-21 NOTE — PROGRESS NOTES
"HPI: Mr. Eduardo Whitley is a 75 year old year old male presenting today for evaluation of chief complaint(s): RECHECK (Follow up - 3 Months)    Mr. Whitley has PMH significant for HTN, HLD and [place] 9 CaP s/p RALP with BPLND 5/19/17 (3 years out) with Dr. Mathur, path showing: [place] 9 (4+5) adenoca with EPE and PNI.  There was no SVI, LVI and the nodes (0/6) and margins were negative, pT3N0.  Decipher 0.55.     His last Urology appt was a virtual visit on 04/21/20 with me.  At that time his PSA was unable to be checked due to COVID.  He had no voiding concerns and reported his sexual function was good.     Today he returns in routine followup. His PSA is undetectable (7/21/20 <0.01ng/dL).   Has been healthy  Eats well.  Exercises regularly.    URINARY SYMPTOMS:  \"pleased\" AUA SS today: 2\" pleased\"  SEXUAL:  Doesn't need Viagra.  Pleased with sexual function.  KESHIA today: 23  Has a soft fleshy mass in the right groin.  Evaluated in the [place] ED in 12/2019 and a CT was performed = lipoma.  Patient wonders about followup.  He doesn't think it is growing. No pain.     Current Outpatient Medications   Medication Sig Dispense Refill     amLODIPine (NORVASC) 5 MG tablet Take 2.5 mg by mouth       atorvastatin (LIPITOR) 20 MG tablet Take 20 mg by mouth       atorvastatin (LIPITOR) 40 MG tablet Take 40 mg by mouth       losartan (COZAAR) 50 MG tablet Take 50 mg by mouth       LOSARTAN POTASSIUM PO Take 50 mg by mouth daily       Multiple vitamin TABS Take 1 tablet by mouth every evening        warfarin (COUMADIN) 2.5 MG tablet ANTICOAGULATION CLINIC MANAGED PT TAKE AS DIRECTED. CALL [phone] WITH ?S  4       ALLERGIES: Patient has no known allergies.      REVIEW OF SYSTEMS:  As above in HPI    GENERAL PHYSICAL EXAM:   Vitals: Ht 1.676 m (5' 6\")   Wt 65.8 kg (145 lb)   BMI 23.40 kg/m    Body mass index is 23.4 kg/m .    GENERAL: Well groomed, well developed, well nourished male in NAD.  MS: Gait normal, normal " muscle tone  Palpable soft mass in the right inner thigh  NEURO: Alert and oriented x 3.  PSYCH: Normal mood and affect, pleasant and agreeable during interview and exam.     :      Inguinal: no hernias or palpable lymph nodes      Circumcised penis, no penile plaques or lesions. Orthotopic location of the urethral meatus.       Scrotum normal.      Testicles of normal firmness and consistency, no masses.     RADIOLOGY: The following tests were reviewed:   3/2018 - Scrotal US:  Impression:   1. No worrisome intratesticular mass.  2. Large cystic lesion with internal avascular dictations and debris  arising from left epididymal head measures up to 3.9 cm.   Internal  complexity and septation favor spermatocele (versus complex epididymal  head cyst.  3. Subcentimeter right epididymal head cyst/spermatocele.     11/27/2019 - CT AP with contrast:  Impression:   1. No worrisome intratesticular mass.  2. Large cystic lesion with internal avascular dictations and debris  arising from left epididymal head measures up to 3.9 cm.   Internal  complexity and septation favor spermatocele (versus complex epididymal  head cyst.  3. Subcentimeter right epididymal head cyst/spermatocele.       LABS: The last test results for Mr. Eduardo Whitley were reviewed:  PSA -   Lab Results   Component Value Date    PSA <0.01 07/21/2020    PSA <0.01 10/01/2019    PSA <0.01 04/09/2019    PSA <0.01 10/09/2018    PSA <0.01 06/06/2018    PSA <0.01 03/09/2018     BMP -   Recent Labs   Lab Test 05/20/17  0729 05/19/17  1333 05/05/17  1601    140  --    POTASSIUM 3.8 3.8 4.5   CHLORIDE 110* 104  --    CO2 25 27  --    BUN 16 20  --    CR 0.95 0.94  --    GLC 98 148*  --    PATRICIA 7.8* 8.2*  --        CBC -   Recent Labs   Lab Test 05/20/17  0729 05/19/17  1333 05/05/17  1601   WBC 9.0 14.5* 8.5   HGB 11.1* 13.1* 14.5    193 236       ASSESSMENT:   1) Prostate cancer  2) right iliopsoas muscle lipoma    PLAN:   - Return in 9 months (April  2021) with a PSA first.  Patient prefers avoiding January weather.   - Follow up with your PCP regarding the intramuscular lipoma in the distal aspect of the right iliopsoas muscle (which is exerting mild mass effect on the femoral vessels)    Kaci Patel PA-C  Department of Urologic Surgery

## 2020-07-21 NOTE — PATIENT INSTRUCTIONS
- Return in 9 months (April 2021) with a PSA first.  Patient prefers avoiding January weather.   - Follow up with your PCP regarding the intramuscular lipoma in the distal aspect of the right iliopsoas muscle (which is exerting mild mass effect on the femoral vessels)    CRISTAL Cohn Urology

## 2020-12-18 NOTE — LETTER
"4/5/2017       RE: Eduardo Whitley  [address] [facility] [address]     Dear Colleague,    Thank you for referring your patient, Eduardo hWitley, to the [facility] UROLOGY AND INST FOR PROSTATE AND UROLOGIC CANCERS at [facility]. Please see a copy of my visit note below.    Urology Clinic Note      Date: 4/5/2017  Time: 11:06 AM  Patient: Eduardo Whitley  MRN: [id number]    HPI/Subjective: Eduardo Whitley is a 72 year old male with an unremarkable past medical history who presented to clinic on 3/21/17 with gradually increasing PSA:    PSA (9/20/16): 7.07  PSA (1/27/17): 8.44  PSA (3/15/17): 8.48    CT Urogram on 10/18/16 did not show evidence of prostate cancer. Cystoscopy at that time was nromal. MRI on 2/2/16 revealed PI-RADS of 3 with a hypointensity of the left peripheral zone of his prostate. Given his MRI and PSA findings, he agreed to MRI fusion biopsy, which was performed on 3/22/17. This showed Nabeel 4+4=8 in his left mid PZ, as well as 3 areas of 4+3=7 of his left base, left mid, and left apex. Further results below.    He experienced some blood in his semen following the biopsy, but this has cleared. He denies hematuria, hematochezia, fevers, chills, or further symptoms. He has no further physical concerns at this time.     Objective:  /90  Pulse 57  Ht 1.689 m (5' 6.5\")  Wt 69.3 kg (152 lb 12.8 oz)  BMI 24.29 kg/m2  GENERAL: No acute distress. Well nourished.   HEENT:  Sclerae anicteric.  Conjunctivae pink.  Moist mucous membranes.  NECK:  No lymphadenopathy.  CARDIAC:  No JVP  LUNGS:  Non-labored breathing  BACK:  No costovertebral tenderness.  SKIN: No rashes.  Dry.     NEURO: normal gait, no focal deficits.     Laboratory Results:  Prostate Biopsy 3/22/17:  FINAL DIAGNOSIS:   A. Prostate, left mid PZ, biopsy:   - Prostatic adenocarcinoma, acinar type   - Grade group 4   - Nabeel score 8 (4+4)   - Extent: Involves one core (0.5 mm; <5%)     B. " Prostate, left base, biopsy:   - Prostatic adenocarcinoma, acinar type   - Grade group 3   - Nabeel score 7 (4+3)   - Extent: Involves two cores (10 mm; 90%; 10 mm, 85%)     C. Prostate, left mid, biopsy:   - Prostatic adenocarcinoma, acinar type   - Grade group 3   - Marcus score 7 (4+3)   - Extent: Involves two cores (9.5 mm; 90%; 8 mm, 70%)     D. Prostate, left apex, biopsy:   - Prostatic adenocarcinoma, acinar type   - Grade group 3   - Nabeel score 7 (4+3)   - Extent: Involves two cores (10 mm; 90%; 5 mm, 50%)     E. Prostate, right base, biopsy:   - High-grade prostatic intraepithelial neoplasm (HGPIN)     F. Prostate, right mid, biopsy:   - Benign prostatic tissue     G. Prostate, right apex, biopsy:   - High-grade prostatic intraepithelial neoplasm (HGPIN)       Assessment & Plan: Eduardo Whitley is a 72 year old male with a recent diagnosis of prostate cancer with 1 core of GL 4+4 in <5% of Left mid peripheral zone as well as 3 areas of GL 4+3. Main treatment options discussed today included robotic radical prostatectomy, radiation therapy and androgen deprivation therapy. Risks and benefits of each of these treatment options were discussed in depth, and he voiced understanding. He wishes to proceed with radical prostatectomy.     - Schedule for robotic radical prostatectomy    - Anesthesia consult    - Bone scan     I, Javier Villa, MS3, am acting as scribe for Dr. Everton Mathur MD.     Patient seen and examined by me.  I agree with the medical student's documentation of the encounter.   I obtained the history and examined the patient and student only acted as a scribe.  Total visit time 15 minutes with >50% spent in counseling  Everton Mathur MD         Patient/Caregiver provided printed discharge information.

## 2020-12-27 ENCOUNTER — HEALTH MAINTENANCE LETTER (OUTPATIENT)
Age: 75
End: 2020-12-27

## 2021-04-19 ENCOUNTER — PRE VISIT (OUTPATIENT)
Dept: UROLOGY | Facility: CLINIC | Age: 76
End: 2021-04-19

## 2021-04-19 NOTE — TELEPHONE ENCOUNTER
Chief Complaint : Follow up - 9 months    Hx/Sx: Prostate cancer (s/p RALP w/ BPLND 5/19/17)    Records/Orders: PSA scheduled for 4/27    Pt Contacted: N/a    At Rooming: CODIE Robertson/KESHIA Morales, EMT

## 2021-04-24 ENCOUNTER — HEALTH MAINTENANCE LETTER (OUTPATIENT)
Age: 76
End: 2021-04-24

## 2021-04-26 ENCOUNTER — TELEPHONE (OUTPATIENT)
Dept: UROLOGY | Facility: CLINIC | Age: 76
End: 2021-04-26

## 2021-04-26 NOTE — TELEPHONE ENCOUNTER
Left message for pt to call and either convert appt tomorrow 4/27/21 at 1:00PM with Fedunok to a virtual or telephone visit or to reschedule appt to next available in clinic visit.

## 2021-04-27 ENCOUNTER — OFFICE VISIT (OUTPATIENT)
Dept: UROLOGY | Facility: CLINIC | Age: 76
End: 2021-04-27
Payer: MEDICARE

## 2021-04-27 DIAGNOSIS — C61 MALIGNANT NEOPLASM OF PROSTATE (H): Primary | ICD-10-CM

## 2021-04-27 DIAGNOSIS — C61 MALIGNANT NEOPLASM OF PROSTATE (H): ICD-10-CM

## 2021-04-27 LAB — PSA SERPL-MCNC: <0.01 UG/L (ref 0–4)

## 2021-04-27 PROCEDURE — 99441 PR PHYSICIAN TELEPHONE EVALUATION 5-10 MIN: CPT | Mod: 95 | Performed by: PHYSICIAN ASSISTANT

## 2021-04-27 PROCEDURE — 84153 ASSAY OF PSA TOTAL: CPT | Performed by: PATHOLOGY

## 2021-04-27 PROCEDURE — 36415 COLL VENOUS BLD VENIPUNCTURE: CPT | Performed by: PATHOLOGY

## 2021-04-27 NOTE — PROGRESS NOTES
Eduardo is a 76 year old who is being evaluated via a billable telephone visit.      What phone number would you like to be contacted at? [phone]  How would you like to obtain your AVS? So  TELEPHONE DURATION: 16 minutes (1:01 - 1:09 + 8 minutes documentation on DOS)

## 2021-04-27 NOTE — PROGRESS NOTES
"HPI: Mr. Eduardo Whitley is a 76 year old year old male presenting today for evaluation of chief complaint(s): RECHECK (PSA)    Mr. Whitley has PMH significant for HTN, HLD, h/o CVA following vascular injury and [place] 9 CaP s/p RALP with BPLND 5/19/17 (4 years out) with Dr. Mathur, path showing: [place] 9 (4+5) adenoca with EPE and PNI.  There was no SVI, LVI and the nodes (0/6) and margins were negative, pT3N0.  Decipher 0.55.     His last Urology appt was a virtual visit on 07/21/20 with me.  He had an undetectable PSA and had no urinary concerns.  KESHIA was 23 without PDE5-I.      Today he returns in routine followup.  PSA continues to be undetectable.    No urinary concerns.    AUA SS today: 0,1,1,0,0,0,3 \"mostly satisfied\"  Sexual fxn remains good.    KESHIA today: 22 (no PDE5I)  No changes to PMH - no new medications or surgeries  Has gotten both doses of the Moderna vaccine.    Eating healthy.  Exercising regularly.  Is trying to keep himself in great shape.    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 CALL [phone] WITH ?S  4       ALLERGIES: Patient has no known allergies.      REVIEW OF SYSTEMS:  As above in HPI    GENERAL PHYSICAL EXAM:   Vitals: There were no vitals taken for this visit.  There is no height or weight on file to calculate BMI.    NEURO: Alert and oriented x 3.  PSYCH: Normal mood and affect, pleasant and agreeable during interview     LABS: The last test results for Mr. Eduardo Whitley were reviewed:  PSA -   Lab Results   Component Value Date    PSA <0.01 " 04/27/2021    PSA <0.01 07/21/2020    PSA <0.01 10/01/2019    PSA <0.01 04/09/2019    PSA <0.01 10/09/2018    PSA <0.01 06/06/2018    PSA <0.01 03/09/2018     BMP -   Recent Labs   Lab Test 05/20/17  0729 05/19/17  1333 05/05/17  1601    140  --    POTASSIUM 3.8 3.8 4.5   CHLORIDE 110* 104  --    CO2 25 27  --    BUN 16 20  --    CR 0.95 0.94  --    GLC 98 148*  --    PATRICIA 7.8* 8.2*  --        CBC -   Recent Labs   Lab Test 05/20/17  0729 05/19/17  1333 05/05/17  1601   WBC 9.0 14.5* 8.5   HGB 11.1* 13.1* 14.5    193 236       ASSESSMENT:   1) Prostate Cancer s/p RALP with BPLND 5/19/17 (4 years out) with Dr. Mathur, path showing: [place] 9 (4+5) negative margins, pT3N0.  Decipher 0.55.    PLAN:   - Return in 5-6 months, early October, with a PSA first (patient prefers in-person)     TELEPHONE DURATION: 16 minutes (1:01 - 1:09 + 8 minutes documentation on DOS)   Kaci Patel PA-C  Department of Urologic Surgery

## 2021-04-27 NOTE — LETTER
"4/27/2021       RE: Eduardo Whitley  [address] [facility] [address]     Dear Colleague,    Thank you for referring your patient, Eduardo Whitley, to the [facility] UROLOGY CLINIC [place] at [facility]. Please see a copy of my visit note below.    HPI: Mr. Eduardo Whitley is a 76 year old year old male presenting today for evaluation of chief complaint(s): RECHECK (PSA)    Mr. Whitley has PMH significant for HTN, HLD, h/o CVA following vascular injury and [place] 9 CaP s/p RALP with BPLND 5/19/17 (4 years out) with Dr. Mathur, path showing: [place] 9 (4+5) adenoca with EPE and PNI.  There was no SVI, LVI and the nodes (0/6) and margins were negative, pT3N0.  Decipher 0.55.     His last Urology appt was a virtual visit on 07/21/20 with me.  He had an undetectable PSA and had no urinary concerns.  KESHIA was 23 without PDE5-I.      Today he returns in routine followup.  PSA continues to be undetectable.    No urinary concerns.    AUA SS today: 0,1,1,0,0,0,3 \"mostly satisfied\"  Sexual fxn remains good.    KESHIA today: 22 (no PDE5I)  No changes to PMH - no new medications or surgeries  Has gotten both doses of the Moderna vaccine.    Eating healthy.  Exercising regularly.  Is trying to keep himself in great shape.    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 CALL [phone] WITH ?S  4       ALLERGIES: Patient has no known allergies.      REVIEW OF SYSTEMS:  As above in HPI    GENERAL PHYSICAL EXAM:   Vitals: There were no vitals taken " for this visit.  There is no height or weight on file to calculate BMI.    NEURO: Alert and oriented x 3.  PSYCH: Normal mood and affect, pleasant and agreeable during interview     LABS: The last test results for Mr. Eduardo Whitley were reviewed:  PSA -   Lab Results   Component Value Date    PSA <0.01 04/27/2021    PSA <0.01 07/21/2020    PSA <0.01 10/01/2019    PSA <0.01 04/09/2019    PSA <0.01 10/09/2018    PSA <0.01 06/06/2018    PSA <0.01 03/09/2018     BMP -   Recent Labs   Lab Test 05/20/17  0729 05/19/17  1333 05/05/17  1601    140  --    POTASSIUM 3.8 3.8 4.5   CHLORIDE 110* 104  --    CO2 25 27  --    BUN 16 20  --    CR 0.95 0.94  --    GLC 98 148*  --    PATRICIA 7.8* 8.2*  --        CBC -   Recent Labs   Lab Test 05/20/17  0729 05/19/17  1333 05/05/17  1601   WBC 9.0 14.5* 8.5   HGB 11.1* 13.1* 14.5    193 236       ASSESSMENT:   1) Prostate Cancer s/p RALP with BPLND 5/19/17 (4 years out) with Dr. Mathur, path showing: [place] 9 (4+5) negative margins, pT3N0.  Decipher 0.55.    PLAN:   - Return in 5-6 months, early October, with a PSA first (patient prefers in-person)     TELEPHONE DURATION: 16 minutes (1:01 - 1:09 + 8 minutes documentation on DOS)   Kaci Patel PA-C  Department of Urologic Surgery      Eduardo is a 76 year old who is being evaluated via a billable telephone visit.      What phone number would you like to be contacted at? [phone]  How would you like to obtain your AVS? MyChart  TELEPHONE DURATION: 16 minutes (1:01 - 1:09 + 8 minutes documentation on DOS)

## 2021-04-27 NOTE — PATIENT INSTRUCTIONS
PLAN:   - Return in 5-6 months, early October, with a PSA first (patient prefers in-person)       Follow up with Kaci Patel PA-C in person.    It was a pleasure meeting with you today.  Thank you for allowing me and my team the privilege of caring for you today.  YOU are the reason we are here, and I truly hope we provided you with the excellent service you deserve.  Please let us know if there is anything else we can do for you so that we can be sure you are leaving completely satisfied with your care experience.        Laina Galloway, CMA

## 2021-09-24 ENCOUNTER — PRE VISIT (OUTPATIENT)
Dept: UROLOGY | Facility: CLINIC | Age: 76
End: 2021-09-24

## 2021-09-24 NOTE — TELEPHONE ENCOUNTER
Reason for visit: follow up     Relevant information: prostate neoplasm    Records/imaging/labs/orders: PSA, in epic    Pt called: no    At Rooming: normal

## 2021-09-28 ENCOUNTER — TELEPHONE (OUTPATIENT)
Dept: UROLOGY | Facility: CLINIC | Age: 76
End: 2021-09-28

## 2021-09-30 ENCOUNTER — LAB (OUTPATIENT)
Dept: LAB | Facility: CLINIC | Age: 76
End: 2021-09-30
Payer: MEDICARE

## 2021-09-30 ENCOUNTER — OFFICE VISIT (OUTPATIENT)
Dept: UROLOGY | Facility: CLINIC | Age: 76
End: 2021-09-30
Payer: MEDICARE

## 2021-09-30 VITALS — HEART RATE: 68 BPM | SYSTOLIC BLOOD PRESSURE: 113 MMHG | DIASTOLIC BLOOD PRESSURE: 78 MMHG

## 2021-09-30 DIAGNOSIS — C61 MALIGNANT NEOPLASM OF PROSTATE (H): Primary | ICD-10-CM

## 2021-09-30 DIAGNOSIS — C61 MALIGNANT NEOPLASM OF PROSTATE (H): ICD-10-CM

## 2021-09-30 LAB — PSA SERPL-MCNC: <0.01 UG/L (ref 0–4)

## 2021-09-30 PROCEDURE — 36415 COLL VENOUS BLD VENIPUNCTURE: CPT | Performed by: PATHOLOGY

## 2021-09-30 PROCEDURE — 99214 OFFICE O/P EST MOD 30 MIN: CPT | Performed by: PHYSICIAN ASSISTANT

## 2021-09-30 PROCEDURE — 84153 ASSAY OF PSA TOTAL: CPT | Performed by: PATHOLOGY

## 2021-09-30 ASSESSMENT — PAIN SCALES - GENERAL: PAINLEVEL: NO PAIN (0)

## 2021-09-30 NOTE — PATIENT INSTRUCTIONS
PLAN:   - Keep an eye on the left epididymal cyst to make sure it doesn't grow or bother him  - Follow up with your PCP about then right groin cyst to review MRI results and ultrasound results.    - No evidence of disease  - Return in 6 months with a PSA (will return in 5/2022)    [facility]

## 2021-09-30 NOTE — NURSING NOTE
Chief Complaint   Patient presents with     Follow Up     follow up        Blood pressure 113/78, pulse 68. There is no height or weight on file to calculate BMI.    Patient Active Problem List   Diagnosis     Elevated prostate specific antigen (PSA)     S/P prostatectomy     Actinic keratoses     Essential hypertension     Hypospadias     Other enthesopathy of ankle and tarsus     Palpable lymph node     Primary prostate cancer (H)     Seborrheic keratoses       Allergies   Allergen Reactions     Amlodipine Swelling       Current Outpatient Medications   Medication Sig Dispense Refill     atorvastatin (LIPITOR) 20 MG tablet Take 20 mg by mouth       LOSARTAN POTASSIUM PO Take 50 mg by mouth daily       Multiple vitamin TABS Take 1 tablet by mouth every evening          Social History     Tobacco Use     Smoking status: Never Smoker     Smokeless tobacco: Never Used   Substance Use Topics     Alcohol use: No     Drug use: None       Kit Arnold  9/30/2021  1:30 PM

## 2021-09-30 NOTE — PROGRESS NOTES
"HPI: Mr. Eduardo Whitley is a 76 year old year old male presenting today for evaluation of chief complaint(s): Follow Up (follow up )    Today, he is unaccompanied     Mr. Whitley has PMH significant for HTN, HLD, h/o CVA following vascular injury and [place] 9 CaP s/p RALP with BPLND 5/19/17 (4 years out) with Dr. Mathur, path showing: [place] 9 (4+5) adenoca with EPE and PNI.  There was no SVI, LVI and the nodes (0/6) and margins were negative, pT3N0.  Decipher 0.55.     His last Urology appt was a virtual visit on 4/27/21 with me.  He had an undetectable PSA and had no urinary concerns.  AUA SS was 5 \"mostly satisfied\".  KESHIA was 22 without PDE5-I.       Today he returns in routine followup with a PSA today that is undetectable.    Urinary: no concerns  Sexual: no concerns and PDE5I not needed  Continues to focus on eating a very healthy diet  Exercises regularly.   Energy is decreased in comparison with when he was younger.   Has a physical coming up in [place] soon and is wondering.    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   Had an MRI and an ultrasound at [facility] ED for a right groin mass - he was told he had a cyst in that area  Left testicular cyst has not grown at all.  No tenderness    Current Outpatient Medications   Medication Sig Dispense Refill     amLODIPine (NORVASC) 5 MG tablet Take 2.5 mg by mouth       atorvastatin (LIPITOR) 20 MG tablet Take 20 mg by mouth       LOSARTAN POTASSIUM PO Take 50 mg by mouth daily       Multiple vitamin TABS Take 1 tablet by mouth every evening          ALLERGIES: Patient has no known allergies.      REVIEW OF SYSTEMS:  As above in HPI    GENERAL PHYSICAL EXAM:   Vitals: /78   Pulse 68   There is no height or weight on file to calculate BMI.    GENERAL: Well groomed, well developed, well nourished male in NAD.  ABD: right " groin/thigh fluctuant mass  Scrotal: + sizeable nontender left epididymal mass  NEURO: Alert and oriented x 3.  PSYCH: Normal mood and affect, pleasant and agreeable during interview and exam.    LABS: The last test results for Mr. Eduardo Whitley were reviewed:  PSA -   Lab Results   Component Value Date    PSA <0.01 04/27/2021    PSA <0.01 07/21/2020    PSA <0.01 10/01/2019    PSA <0.01 04/09/2019    PSA <0.01 10/09/2018    PSA <0.01 06/06/2018    PSA <0.01 03/09/2018     BMP -   Recent Labs   Lab Test 05/20/17  0729 05/19/17  1333 05/05/17  1601    140  --    POTASSIUM 3.8 3.8 4.5   CHLORIDE 110* 104  --    CO2 25 27  --    BUN 16 20  --    CR 0.95 0.94  --    GLC 98 148*  --    PATRICIA 7.8* 8.2*  --        CBC -   Recent Labs   Lab Test 05/20/17  0729 05/19/17  1333 05/05/17  1601   WBC 9.0 14.5* 8.5   HGB 11.1* 13.1* 14.5    193 236       ASSESSMENT:   1) Prostate Cancer s/p RALP with BPLND 5/19/17 (4 years out) with Dr. Mathur, path showing: [place] 9 (4+5) negative margins, pT3N0.  Decipher 0.55. No evidence of disease currently.      PLAN:   - Will keep an eye on the left epididymal cyst to make sure it doesn't grow or bother him  - Will follow up with his PCP about his right groin cyst to review MRI results and ultrasound results.    - No evidence of disease  - Return in 6 months with a PSA (will return in 5/2022)    VISIT DURATION: 27 minutes (1:31 - 1:58)    Kaci Patel PA-C  Department of Urologic Surgery

## 2021-09-30 NOTE — LETTER
"9/30/2021       RE: Eduardo Whitley  [address] [facility] [address]     Dear Colleague,    Thank you for referring your patient, Eduardo Whitley, to the [facility] UROLOGY CLINIC [place] at [facility]. Please see a copy of my visit note below.    HPI: Mr. Eduardo Whitley is a 76 year old year old male presenting today for evaluation of chief complaint(s): Follow Up (follow up )    Today, he is unaccompanied     Mr. Whitley has PMH significant for HTN, HLD, h/o CVA following vascular injury and [place] 9 CaP s/p RALP with BPLND 5/19/17 (4 years out) with Dr. Mathur, path showing: [place] 9 (4+5) adenoca with EPE and PNI.  There was no SVI, LVI and the nodes (0/6) and margins were negative, pT3N0.  Decipher 0.55.     His last Urology appt was a virtual visit on 4/27/21 with me.  He had an undetectable PSA and had no urinary concerns.  AUA SS was 5 \"mostly satisfied\".  KESHIA was 22 without PDE5-I.       Today he returns in routine followup with a PSA today that is undetectable.    Urinary: no concerns  Sexual: no concerns and PDE5I not needed  Continues to focus on eating a very healthy diet  Exercises regularly.   Energy is decreased in comparison with when he was younger.   Has a physical coming up in [place] soon and is wondering.    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   Had an MRI and an ultrasound at [facility] ED for a right groin mass - he was told he had a cyst in that area  Left testicular cyst has not grown at all.  No tenderness    Current Outpatient Medications   Medication Sig Dispense Refill     amLODIPine (NORVASC) 5 MG tablet Take 2.5 mg by mouth       atorvastatin (LIPITOR) 20 MG tablet Take 20 mg by mouth       LOSARTAN POTASSIUM PO Take 50 mg by mouth daily       Multiple vitamin TABS Take 1 tablet by mouth " every evening          ALLERGIES: Patient has no known allergies.      REVIEW OF SYSTEMS:  As above in HPI    GENERAL PHYSICAL EXAM:   Vitals: /78   Pulse 68   There is no height or weight on file to calculate BMI.    GENERAL: Well groomed, well developed, well nourished male in NAD.  ABD: right groin/thigh fluctuant mass  Scrotal: + sizeable nontender left epididymal mass  NEURO: Alert and oriented x 3.  PSYCH: Normal mood and affect, pleasant and agreeable during interview and exam.    LABS: The last test results for Mr. Eduardo Whitley were reviewed:  PSA -   Lab Results   Component Value Date    PSA <0.01 04/27/2021    PSA <0.01 07/21/2020    PSA <0.01 10/01/2019    PSA <0.01 04/09/2019    PSA <0.01 10/09/2018    PSA <0.01 06/06/2018    PSA <0.01 03/09/2018     BMP -   Recent Labs   Lab Test 05/20/17  0729 05/19/17  1333 05/05/17  1601    140  --    POTASSIUM 3.8 3.8 4.5   CHLORIDE 110* 104  --    CO2 25 27  --    BUN 16 20  --    CR 0.95 0.94  --    GLC 98 148*  --    PATRICIA 7.8* 8.2*  --        CBC -   Recent Labs   Lab Test 05/20/17  0729 05/19/17  1333 05/05/17  1601   WBC 9.0 14.5* 8.5   HGB 11.1* 13.1* 14.5    193 236       ASSESSMENT:   1) Prostate Cancer s/p RALP with BPLND 5/19/17 (4 years out) with Dr. Mathur, path showing: [place] 9 (4+5) negative margins, pT3N0.  Decipher 0.55. No evidence of disease currently.      PLAN:   - Will keep an eye on the left epididymal cyst to make sure it doesn't grow or bother him  - Will follow up with his PCP about his right groin cyst to review MRI results and ultrasound results.    - No evidence of disease  - Return in 6 months with a PSA (will return in 5/2022)    VISIT DURATION: 27 minutes (1:31 - 1:58)    Kaci Patel PA-C  Department of Urologic Surgery

## 2021-10-09 ENCOUNTER — HEALTH MAINTENANCE LETTER (OUTPATIENT)
Age: 76
End: 2021-10-09

## 2022-05-12 ENCOUNTER — PRE VISIT (OUTPATIENT)
Dept: UROLOGY | Facility: CLINIC | Age: 77
End: 2022-05-12
Payer: MEDICARE

## 2022-05-12 NOTE — TELEPHONE ENCOUNTER
Reason for visit: follow up     Relevant information: hx prostate cancer    Records/imaging/labs/orders: PSA scheduled in epic    Pt called: no    At Rooming: normal

## 2022-05-19 ENCOUNTER — OFFICE VISIT (OUTPATIENT)
Dept: UROLOGY | Facility: CLINIC | Age: 77
End: 2022-05-19
Payer: MEDICARE

## 2022-05-19 ENCOUNTER — LAB (OUTPATIENT)
Dept: LAB | Facility: CLINIC | Age: 77
End: 2022-05-19
Attending: PHYSICIAN ASSISTANT

## 2022-05-19 VITALS — SYSTOLIC BLOOD PRESSURE: 158 MMHG | DIASTOLIC BLOOD PRESSURE: 98 MMHG | HEART RATE: 59 BPM

## 2022-05-19 DIAGNOSIS — C61 MALIGNANT NEOPLASM OF PROSTATE (H): Primary | ICD-10-CM

## 2022-05-19 DIAGNOSIS — C61 MALIGNANT NEOPLASM OF PROSTATE (H): ICD-10-CM

## 2022-05-19 LAB — PSA SERPL-MCNC: <0.01 UG/L (ref 0–4)

## 2022-05-19 PROCEDURE — 84153 ASSAY OF PSA TOTAL: CPT | Performed by: PATHOLOGY

## 2022-05-19 PROCEDURE — 99213 OFFICE O/P EST LOW 20 MIN: CPT | Performed by: PHYSICIAN ASSISTANT

## 2022-05-19 PROCEDURE — 36415 COLL VENOUS BLD VENIPUNCTURE: CPT | Performed by: PATHOLOGY

## 2022-05-19 ASSESSMENT — PAIN SCALES - GENERAL: PAINLEVEL: NO PAIN (0)

## 2022-05-19 NOTE — PROGRESS NOTES
HPI: Mr. Eduardo Whitley is a 77 year old year old male presenting today for evaluation of chief complaint(s): Follow Up (PSA)    Today, he is unaccompanied    Mr. Whitley has PMH significant for HTN, HLD, h/o CVA following vascular injury and [place] 9 CaP s/p RALP with BPLND 5/19/17 (4 years out) with Dr. Mathur, path showing: [place] 9 (4+5) adenoca with EPE and PNI.  There was no SVI, LVI and the nodes (0/6) and margins were negative, pT3N0.  Decipher 0.55.     His last Urology appt was a virtual visit on 9/30/21 with me.  At that time his PSA was undetectable.  He had no urinary concerns.  No sexual concerns either (not using PDE5Is).   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     Today he returns in routine followup, again with an undetectable PSA.   - Also gets PSAs monitored locally with his annual physical with [facility] in October.  These PSAs, which only measure to <0.05ug/L, have also been undetectable.   - Urinary symptoms:  No concerns. If he is doing extremely strenuous work, he will have some very slight leaks.   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  Would like to move after the book is completed.    - Continues to eat very healthfully. Exercise - does very light aerobic exercise (heavy walking and light weights)   - continues to be affected by world events and sometimes gets depressed when he thinks about these things.     Current Outpatient Medications   Medication Sig Dispense Refill     atorvastatin (LIPITOR) 20 MG tablet Take 20 mg by mouth       LOSARTAN POTASSIUM PO Take 50 mg by mouth daily       Multiple vitamin TABS Take 1 tablet by mouth every evening          ALLERGIES: Amlodipine      REVIEW OF SYSTEMS:  As above in HPI    GENERAL  PHYSICAL EXAM:   Vitals: BP (!) 158/98   Pulse 59   There is no height or weight on file to calculate BMI.    GENERAL: Well groomed, well developed, well nourished male in NAD.  NEURO: Alert and oriented x 3.  PSYCH: Normal mood and affect, pleasant and agreeable during interview and exam.     RADIOLOGY: The following tests were reviewed:   Patient Name:   RICKEY AYALA    YOB: 1945    Procedure: CT ABDOMEN PELVIS WITH CONTRAST    Date of Service: 11/27/2019       EXAM: CT ABDOMEN PELVIS WITH CONTRAST     INDICATION: Hernia, complicated,right groin swelling possible femoral hernia     COMPARISON(S): Ultrasound right groin from 11/27/2019. CT urogram from 10/6/2016     TECHNIQUE: Multiple CT images of the abdomen and pelvis were obtained following the administration of iodinated intravenous contrast. Multiplanar reconstructions were obtained and reviewed.     FINDINGS:   Lung bases are clear. Multiple punctate calcified granulomas noted in the spleen. Liver, gallbladder, adrenal glands and pancreas are unremarkable. There are unchanged multiple simple, parapelvic and hemorrhagic cysts in both kidneys, better evaluated on prior CT. There appears to be a tiny 5 mm angiomyolipoma in the anterior cortex of the inferior pole the left kidney. Large and small bowel are unremarkable. No lymphadenopathy identified.     Within the pelvis, bladder contour is normal. There is no free fluid. No inguinal or femoral hernia identified. Body wall appears intact. There is an unchanged intramuscular lipoma in the far distal aspect of the right iliopsoas muscle near the lesser trochanter measuring 6.8 x 4.2 cm in greatest axial cross-section exerting mild mass effect on the posterior margin of the femoral vessels. This lesion measures 8.9 cm in craniocaudal extent. No nodularity or abnormal enhancement in the lesion.     IMPRESSION:   1.  No evidence of hernia.   2.  Unchanged intramuscular lipoma in the distal  aspect of the right iliopsoas muscle near the lesser trochanter exerting mild mass effect on the femoral vessels.   3.  Unchanged appearance of multiple simple, hemorrhagic and parapelvic cysts in the kidneys, better evaluated on the prior CT urogram from 2016.   4.  Tiny 5 mm angiomyolipoma in the anterior cortex of the inferior pole of the left kidney.        LABS: The last test results for Mr. Eduardo Whitley were reviewed:  PSA -   Lab Results   Component Value Date    PSA <0.01 05/19/2022    PSA <0.01 09/30/2021    PSA <0.01 04/27/2021    PSA <0.01 07/21/2020    PSA <0.01 10/01/2019    PSA <0.01 04/09/2019    PSA <0.01 10/09/2018    PSA <0.01 06/06/2018    PSA <0.01 03/09/2018     BMP -   Recent Labs   Lab Test 05/20/17  0729 05/19/17  1333 05/05/17  1601    140  --    POTASSIUM 3.8 3.8 4.5   CHLORIDE 110* 104  --    CO2 25 27  --    BUN 16 20  --    CR 0.95 0.94  --    GLC 98 148*  --    PATRICIA 7.8* 8.2*  --        CBC -   Recent Labs   Lab Test 05/20/17  0729 05/19/17  1333 05/05/17  1601   WBC 9.0 14.5* 8.5   HGB 11.1* 13.1* 14.5    193 236       ASSESSMENT:   1) Prostate Cancer s/p RALP with BPLND 5/19/17 (4 years out) with Dr. Mathur, path showing: [place] 9 (4+5) negative margins, pT3N0.  Decipher 0.55. No evidence of disease currently.     PLAN:   - Follow up in a year with another PSA    VISIT DURATION: 24 (1:06 - 1:25 + 5 minutes documentation on DOS)    Kaci Patel PA-C  Department of Urologic Surgery

## 2022-05-19 NOTE — NURSING NOTE
Chief Complaint   Patient presents with     Follow Up     PSA       Blood pressure (!) 158/98, pulse 59. There is no height or weight on file to calculate BMI.    Patient Active Problem List   Diagnosis     Elevated prostate specific antigen (PSA)     S/P prostatectomy     Actinic keratoses     Essential hypertension     Hypospadias     Other enthesopathy of ankle and tarsus     Palpable lymph node     Primary prostate cancer (H)     Seborrheic keratoses       Allergies   Allergen Reactions     Amlodipine Swelling       Current Outpatient Medications   Medication Sig Dispense Refill     atorvastatin (LIPITOR) 20 MG tablet Take 20 mg by mouth       LOSARTAN POTASSIUM PO Take 50 mg by mouth daily       Multiple vitamin TABS Take 1 tablet by mouth every evening          Social History     Tobacco Use     Smoking status: Never Smoker     Smokeless tobacco: Never Used   Substance Use Topics     Alcohol use: No       Kit Arnold  5/19/2022  1:01 PM

## 2022-05-19 NOTE — LETTER
5/19/2022       RE: Eduardo Whitley  [address] [facility] [address]     Dear Colleague,    Thank you for referring your patient, Eduardo Whitley, to the [facility] [place] at [facility]. Please see a copy of my visit note below.    HPI: Mr. Eduardo Whitley is a 77 year old year old male presenting today for evaluation of chief complaint(s): Follow Up (PSA)    Today, he is unaccompanied    Mr. Whitley has PMH significant for HTN, HLD, h/o CVA following vascular injury and [place] 9 CaP s/p RALP with BPLND 5/19/17 (4 years out) with Dr. Mathur, path showing: [place] 9 (4+5) adenoca with EPE and PNI.  There was no SVI, LVI and the nodes (0/6) and margins were negative, pT3N0.  Decipher 0.55.     His last Urology appt was a virtual visit on 9/30/21 with me.  At that time his PSA was undetectable.  He had no urinary concerns.  No sexual concerns either (not using PDE5Is).   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     Today he returns in routine followup, again with an undetectable PSA.   - Also gets PSAs monitored locally with his annual physical with [facility] in October.  These PSAs, which only measure to <0.05ug/L, have also been undetectable.   - Urinary symptoms:  No concerns. If he is doing extremely strenuous work, he will have some very slight leaks.   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  Would like to move after the book is completed.    - Continues to eat very healthfully. Exercise - does very light aerobic exercise (heavy walking and light weights)   - continues to be affected by world events and sometimes gets depressed when he thinks about these things.     Current Outpatient  Medications   Medication Sig Dispense Refill     atorvastatin (LIPITOR) 20 MG tablet Take 20 mg by mouth       LOSARTAN POTASSIUM PO Take 50 mg by mouth daily       Multiple vitamin TABS Take 1 tablet by mouth every evening          ALLERGIES: Amlodipine      REVIEW OF SYSTEMS:  As above in HPI    GENERAL PHYSICAL EXAM:   Vitals: BP (!) 158/98   Pulse 59   There is no height or weight on file to calculate BMI.    GENERAL: Well groomed, well developed, well nourished male in NAD.  NEURO: Alert and oriented x 3.  PSYCH: Normal mood and affect, pleasant and agreeable during interview and exam.     RADIOLOGY: The following tests were reviewed:   Patient Name:   RICKEY AYALA    YOB: 1945    Procedure: CT ABDOMEN PELVIS WITH CONTRAST    Date of Service: 11/27/2019       EXAM: CT ABDOMEN PELVIS WITH CONTRAST     INDICATION: Hernia, complicated,right groin swelling possible femoral hernia     COMPARISON(S): Ultrasound right groin from 11/27/2019. CT urogram from 10/6/2016     TECHNIQUE: Multiple CT images of the abdomen and pelvis were obtained following the administration of iodinated intravenous contrast. Multiplanar reconstructions were obtained and reviewed.     FINDINGS:   Lung bases are clear. Multiple punctate calcified granulomas noted in the spleen. Liver, gallbladder, adrenal glands and pancreas are unremarkable. There are unchanged multiple simple, parapelvic and hemorrhagic cysts in both kidneys, better evaluated on prior CT. There appears to be a tiny 5 mm angiomyolipoma in the anterior cortex of the inferior pole the left kidney. Large and small bowel are unremarkable. No lymphadenopathy identified.     Within the pelvis, bladder contour is normal. There is no free fluid. No inguinal or femoral hernia identified. Body wall appears intact. There is an unchanged intramuscular lipoma in the far distal aspect of the right iliopsoas muscle near the lesser trochanter measuring 6.8 x 4.2 cm in  greatest axial cross-section exerting mild mass effect on the posterior margin of the femoral vessels. This lesion measures 8.9 cm in craniocaudal extent. No nodularity or abnormal enhancement in the lesion.     IMPRESSION:   1.  No evidence of hernia.   2.  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   3.  Unchanged appearance of multiple simple, hemorrhagic and parapelvic cysts in the kidneys, better evaluated on the prior CT urogram from 2016.   4.  Tiny 5 mm angiomyolipoma in the anterior cortex of the inferior pole of the left kidney.        LABS: The last test results for Mr. Eduardo Whitley were reviewed:  PSA -   Lab Results   Component Value Date    PSA <0.01 05/19/2022    PSA <0.01 09/30/2021    PSA <0.01 04/27/2021    PSA <0.01 07/21/2020    PSA <0.01 10/01/2019    PSA <0.01 04/09/2019    PSA <0.01 10/09/2018    PSA <0.01 06/06/2018    PSA <0.01 03/09/2018     BMP -   Recent Labs   Lab Test 05/20/17  0729 05/19/17  1333 05/05/17  1601    140  --    POTASSIUM 3.8 3.8 4.5   CHLORIDE 110* 104  --    CO2 25 27  --    BUN 16 20  --    CR 0.95 0.94  --    GLC 98 148*  --    PATRICIA 7.8* 8.2*  --        CBC -   Recent Labs   Lab Test 05/20/17  0729 05/19/17  1333 05/05/17  1601   WBC 9.0 14.5* 8.5   HGB 11.1* 13.1* 14.5    193 236       ASSESSMENT:   1) Prostate Cancer s/p RALP with BPLND 5/19/17 (4 years out) with Dr. Mathur, path showing: [place] 9 (4+5) negative margins, pT3N0.  Decipher 0.55. No evidence of disease currently.     PLAN:   - Follow up in a year with another PSA    VISIT DURATION: 24 (1:06 - 1:25 + 5 minutes documentation on DOS)    Kaci Patel PA-C  Department of Urologic Surgery

## 2022-05-21 ENCOUNTER — HEALTH MAINTENANCE LETTER (OUTPATIENT)
Age: 77
End: 2022-05-21

## 2022-09-11 ENCOUNTER — HEALTH MAINTENANCE LETTER (OUTPATIENT)
Age: 77
End: 2022-09-11

## 2023-06-03 ENCOUNTER — HEALTH MAINTENANCE LETTER (OUTPATIENT)
Age: 78
End: 2023-06-03

## 2023-06-21 ENCOUNTER — PRE VISIT (OUTPATIENT)
Dept: UROLOGY | Facility: CLINIC | Age: 78
End: 2023-06-21
Payer: MEDICARE

## 2023-06-21 NOTE — TELEPHONE ENCOUNTER
Reason for Visit: Annual follow up, PSA check    Diagnosis: Prostate Cancer s/p RALP with BPLND 5/19/17     Orders/Procedures/Records: PSA scheduled    Contact Patient: n/a    Rooming Requirements: normal      Jennifer Guevara LPN  06/21/23  10:43 AM

## 2023-06-22 ENCOUNTER — LAB (OUTPATIENT)
Dept: LAB | Facility: CLINIC | Age: 78
End: 2023-06-22
Payer: MEDICARE

## 2023-06-22 ENCOUNTER — OFFICE VISIT (OUTPATIENT)
Dept: UROLOGY | Facility: CLINIC | Age: 78
End: 2023-06-22
Payer: MEDICARE

## 2023-06-22 VITALS
SYSTOLIC BLOOD PRESSURE: 139 MMHG | BODY MASS INDEX: 24.11 KG/M2 | HEIGHT: 66 IN | WEIGHT: 150 LBS | HEART RATE: 65 BPM | DIASTOLIC BLOOD PRESSURE: 82 MMHG

## 2023-06-22 DIAGNOSIS — C61 MALIGNANT NEOPLASM OF PROSTATE (H): ICD-10-CM

## 2023-06-22 DIAGNOSIS — C61 MALIGNANT NEOPLASM OF PROSTATE (H): Primary | ICD-10-CM

## 2023-06-22 LAB — PSA SERPL DL<=0.01 NG/ML-MCNC: <0.01 NG/ML (ref 0–6.5)

## 2023-06-22 PROCEDURE — 99213 OFFICE O/P EST LOW 20 MIN: CPT | Performed by: PHYSICIAN ASSISTANT

## 2023-06-22 PROCEDURE — 36415 COLL VENOUS BLD VENIPUNCTURE: CPT | Performed by: PATHOLOGY

## 2023-06-22 PROCEDURE — 84153 ASSAY OF PSA TOTAL: CPT | Performed by: PATHOLOGY

## 2023-06-22 RX ORDER — ATORVASTATIN CALCIUM 20 MG/1
20 TABLET, FILM COATED ORAL DAILY
COMMUNITY

## 2023-06-22 ASSESSMENT — PAIN SCALES - GENERAL: PAINLEVEL: NO PAIN (0)

## 2023-06-22 NOTE — PATIENT INSTRUCTIONS
- Return in 1 year with a PSA     It was great seeing you again!  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     [facility]

## 2023-06-22 NOTE — LETTER
"6/22/2023       RE: Eduardo Whitley  [address] [facility] [address]     Dear Colleague,    Thank you for referring your patient, Eduardo Whitley, to the [facility] UROLOGY [facility] [place] at [facility]. Please see a copy of my visit note below.    HPI: Mr. Eduardo Whitley is a 78 year old year old male presenting today for evaluation of chief complaint(s): Follow Up (Yearly psa testing and follow up to stay connected. )    Today, he is unaccompanied in clinic     Mr. Whitley has PMH significant for HTN, HLD, h/o CVA following vascular injury and [place] 9 CaP s/p RALP with BPLND 5/19/17 (4 years out) with Dr. Mathur, path showing: [place] 9 (4+5) adenoca with EPE and PNI.  There was no SVI, LVI and the nodes (0/6) and margins were negative, pT3N0.  Decipher 0.55.     His last Urology appt was a virtual visit on 5/19/22 with me with a PSA <0.05ug/L at that time. He was pleased with his continence.  He was monitoring a lipoma with serial ultrasounds with his PCP.  He was working on his book.      Today he returns with a PSA <0.01ug/L.   No change in urinary symptoms.   Continues to eat very healthy.  Is about 5-6 lbs more than he wants to be.  Tries to eat earlier in the morning and fast toward the end of the day.    Health is otherwise stable.      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      Current Outpatient Medications   Medication Sig Dispense Refill    atorvastatin (LIPITOR) 20 MG tablet Take 20 mg by mouth      LOSARTAN POTASSIUM PO Take 50 mg by mouth daily      Multiple vitamin TABS Take 1 tablet by mouth every evening          ALLERGIES: Amlodipine      REVIEW OF SYSTEMS:  As above in HPI     GENERAL PHYSICAL EXAM:   Vitals: /82 (BP Location: Right arm, Patient Position: Sitting, Cuff Size: Adult Small)   Pulse 65   Ht 1.676 m (5' 6\")   Wt 68 kg (150 lb)   BMI 24.21 kg/m    Body mass index " is 24.21 kg/m .    GENERAL: Well groomed, well developed, well nourished male in NAD.  NEURO: Alert and oriented x 3.  PSYCH: Normal mood and affect, pleasant and agreeable during interview and exam.    RADIOLOGY: The following tests were reviewed:    Patient Name:   RICKEY AYALA    YOB: 1945    Procedure: CT ABDOMEN PELVIS WITH CONTRAST    Date of Service: 11/27/2019       EXAM: CT ABDOMEN PELVIS WITH CONTRAST     INDICATION: Hernia, complicated,right groin swelling possible femoral hernia     COMPARISON(S): Ultrasound right groin from 11/27/2019. CT urogram from 10/6/2016     TECHNIQUE: Multiple CT images of the abdomen and pelvis were obtained following the administration of iodinated intravenous contrast. Multiplanar reconstructions were obtained and reviewed.     FINDINGS:   Lung bases are clear. Multiple punctate calcified granulomas noted in the spleen. Liver, gallbladder, adrenal glands and pancreas are unremarkable. There are unchanged multiple simple, parapelvic and hemorrhagic cysts in both kidneys, better evaluated on prior CT. There appears to be a tiny 5 mm angiomyolipoma in the anterior cortex of the inferior pole the left kidney. Large and small bowel are unremarkable. No lymphadenopathy identified.     Within the pelvis, bladder contour is normal. There is no free fluid. No inguinal or femoral hernia identified. Body wall appears intact. There is an unchanged intramuscular lipoma in the far distal aspect of the right iliopsoas muscle near the lesser trochanter measuring 6.8 x 4.2 cm in greatest axial cross-section exerting mild mass effect on the posterior margin of the femoral vessels. This lesion measures 8.9 cm in craniocaudal extent. No nodularity or abnormal enhancement in the lesion.     IMPRESSION:   1.  No evidence of hernia.   2.  Unchanged intramuscular lipoma in the distal aspect of the right iliopsoas muscle near the lesser trochanter exerting mild mass effect on the  femoral vessels.   3.  Unchanged appearance of multiple simple, hemorrhagic and parapelvic cysts in the kidneys, better evaluated on the prior CT urogram from 2016.   4.  Tiny 5 mm angiomyolipoma in the anterior cortex of the inferior pole of the left kidney.        LABS: The last test results for Mr. Eduardo Whitley were reviewed:  PSA -   Lab Results   Component Value Date    PSA <0.01 05/19/2022    PSA <0.01 09/30/2021    PSA <0.01 04/27/2021    PSA <0.01 07/21/2020    PSA <0.01 10/01/2019    PSA <0.01 04/09/2019    PSA <0.01 10/09/2018    PSA <0.01 06/06/2018    PSA <0.01 03/09/2018     BMP -   Recent Labs   Lab Test 05/20/17  0729 05/19/17  1333 05/05/17  1601    140  --    POTASSIUM 3.8 3.8 4.5   CHLORIDE 110* 104  --    CO2 25 27  --    BUN 16 20  --    CR 0.95 0.94  --    GLC 98 148*  --    PATRICIA 7.8* 8.2*  --        CBC -   Recent Labs   Lab Test 05/20/17  0729 05/19/17  1333 05/05/17  1601   WBC 9.0 14.5* 8.5   HGB 11.1* 13.1* 14.5    193 236       ASSESSMENT:   1) Prostate Cancer s/p RALP with BPLND 5/19/17 with Dr. Mathur, path showing: [place] 9 (4+5) negative margins, pT3N0.  Decipher 0.55. No evidence of disease currently.     PLAN:   - You are 6 years out from prostatectomy!  - Return in 1 year with a PSA     VISIT DURATION: 20 minutes (1:36 - 1:56 on DOS)    Kaci Patel PA-C  Department of Urologic Surgery

## 2023-06-22 NOTE — PROGRESS NOTES
"HPI: Mr. Eduardo Whitley is a 78 year old year old male presenting today for evaluation of chief complaint(s): Follow Up (Yearly psa testing and follow up to stay connected. )    Today, he is unaccompanied in clinic     Mr. Whitley has PMH significant for HTN, HLD, h/o CVA following vascular injury and [place] 9 CaP s/p RALP with BPLND 5/19/17 (4 years out) with Dr. Mathur, path showing: [place] 9 (4+5) adenoca with EPE and PNI.  There was no SVI, LVI and the nodes (0/6) and margins were negative, pT3N0.  Decipher 0.55.     His last Urology appt was a virtual visit on 5/19/22 with me with a PSA <0.05ug/L at that time. He was pleased with his continence.  He was monitoring a lipoma with serial ultrasounds with his PCP.  He was working on his book.      Today he returns with a PSA <0.01ug/L.   No change in urinary symptoms.   Continues to eat very healthy.  Is about 5-6 lbs more than he wants to be.  Tries to eat earlier in the morning and fast toward the end of the day.    Health is otherwise stable.      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      Current Outpatient Medications   Medication Sig Dispense Refill     atorvastatin (LIPITOR) 20 MG tablet Take 20 mg by mouth       LOSARTAN POTASSIUM PO Take 50 mg by mouth daily       Multiple vitamin TABS Take 1 tablet by mouth every evening          ALLERGIES: Amlodipine      REVIEW OF SYSTEMS:  As above in HPI     GENERAL PHYSICAL EXAM:   Vitals: /82 (BP Location: Right arm, Patient Position: Sitting, Cuff Size: Adult Small)   Pulse 65   Ht 1.676 m (5' 6\")   Wt 68 kg (150 lb)   BMI 24.21 kg/m    Body mass index is 24.21 kg/m .    GENERAL: Well groomed, well developed, well nourished male in NAD.  NEURO: Alert and oriented x 3.  PSYCH: Normal mood and affect, pleasant and agreeable during interview and exam.    RADIOLOGY: The following tests were reviewed:    Patient Name:   EDUARDO WHITLEY    Date of Birth: "  1945    Procedure: CT ABDOMEN PELVIS WITH CONTRAST    Date of Service: 11/27/2019       EXAM: CT ABDOMEN PELVIS WITH CONTRAST     INDICATION: Hernia, complicated,right groin swelling possible femoral hernia     COMPARISON(S): Ultrasound right groin from 11/27/2019. CT urogram from 10/6/2016     TECHNIQUE: Multiple CT images of the abdomen and pelvis were obtained following the administration of iodinated intravenous contrast. Multiplanar reconstructions were obtained and reviewed.     FINDINGS:   Lung bases are clear. Multiple punctate calcified granulomas noted in the spleen. Liver, gallbladder, adrenal glands and pancreas are unremarkable. There are unchanged multiple simple, parapelvic and hemorrhagic cysts in both kidneys, better evaluated on prior CT. There appears to be a tiny 5 mm angiomyolipoma in the anterior cortex of the inferior pole the left kidney. Large and small bowel are unremarkable. No lymphadenopathy identified.     Within the pelvis, bladder contour is normal. There is no free fluid. No inguinal or femoral hernia identified. Body wall appears intact. There is an unchanged intramuscular lipoma in the far distal aspect of the right iliopsoas muscle near the lesser trochanter measuring 6.8 x 4.2 cm in greatest axial cross-section exerting mild mass effect on the posterior margin of the femoral vessels. This lesion measures 8.9 cm in craniocaudal extent. No nodularity or abnormal enhancement in the lesion.     IMPRESSION:   1.  No evidence of hernia.   2.  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   3.  Unchanged appearance of multiple simple, hemorrhagic and parapelvic cysts in the kidneys, better evaluated on the prior CT urogram from 2016.   4.  Tiny 5 mm angiomyolipoma in the anterior cortex of the inferior pole of the left kidney.        LABS: The last test results for .  Eduardo Whitley were reviewed:  PSA -   Lab Results   Component Value Date    PSA <0.01 05/19/2022    PSA <0.01 09/30/2021    PSA <0.01 04/27/2021    PSA <0.01 07/21/2020    PSA <0.01 10/01/2019    PSA <0.01 04/09/2019    PSA <0.01 10/09/2018    PSA <0.01 06/06/2018    PSA <0.01 03/09/2018     BMP -   Recent Labs   Lab Test 05/20/17  0729 05/19/17  1333 05/05/17  1601    140  --    POTASSIUM 3.8 3.8 4.5   CHLORIDE 110* 104  --    CO2 25 27  --    BUN 16 20  --    CR 0.95 0.94  --    GLC 98 148*  --    PATRICIA 7.8* 8.2*  --        CBC -   Recent Labs   Lab Test 05/20/17  0729 05/19/17  1333 05/05/17  1601   WBC 9.0 14.5* 8.5   HGB 11.1* 13.1* 14.5    193 236       ASSESSMENT:   1) Prostate Cancer s/p RALP with BPLND 5/19/17 with Dr. Mathur, path showing: [place] 9 (4+5) negative margins, pT3N0.  Decipher 0.55. No evidence of disease currently.     PLAN:   - You are 6 years out from prostatectomy!  - Return in 1 year with a PSA     VISIT DURATION: 20 minutes (1:36 - 1:56 on DOS)    Kaci Patel PA-C  Department of Urologic Surgery

## 2023-06-22 NOTE — NURSING NOTE
"Chief Complaint   Patient presents with     Follow Up     Yearly psa testing and follow up to stay connected.        Blood pressure 139/82, pulse 65, height 1.676 m (5' 6\"), weight 68 kg (150 lb). Body mass index is 24.21 kg/m .    Patient Active Problem List   Diagnosis     Elevated prostate specific antigen (PSA)     S/P prostatectomy     Actinic keratoses     Essential hypertension     Hypospadias     Other enthesopathy of ankle and tarsus     Palpable lymph node     Primary prostate cancer (H)     Seborrheic keratoses       Allergies   Allergen Reactions     Amlodipine Swelling       Current Outpatient Medications   Medication Sig Dispense Refill     atorvastatin (LIPITOR) 20 MG tablet Take 20 mg by mouth daily       LOSARTAN POTASSIUM PO Take 50 mg by mouth daily       Multiple vitamin TABS Take 1 tablet by mouth every evening          Social History     Tobacco Use     Smoking status: Never     Smokeless tobacco: Never   Substance Use Topics     Alcohol use: No       Ricardo Palma  6/22/2023  1:31 PM     "

## 2024-01-17 ENCOUNTER — TELEPHONE (OUTPATIENT)
Dept: UROLOGY | Facility: CLINIC | Age: 79
End: 2024-01-17
Payer: COMMERCIAL

## 2024-07-13 ENCOUNTER — HEALTH MAINTENANCE LETTER (OUTPATIENT)
Age: 79
End: 2024-07-13

## 2024-08-22 ENCOUNTER — PRE VISIT (OUTPATIENT)
Dept: UROLOGY | Facility: CLINIC | Age: 79
End: 2024-08-22
Payer: COMMERCIAL

## 2024-08-22 NOTE — TELEPHONE ENCOUNTER
Reason for visit: one year follow up     Relevant information: malignant neoplasm of prostate    Records/imaging/labs/orders: all records available    Pt called: no need for a call    At Rooming: have pt empty bladder/pvr,  Standard rooming      Flavio Swift  8/22/2024  11:27 AM

## 2025-05-15 ENCOUNTER — PRE VISIT (OUTPATIENT)
Dept: UROLOGY | Facility: CLINIC | Age: 80
End: 2025-05-15
Payer: MEDICARE

## 2025-05-15 NOTE — TELEPHONE ENCOUNTER
Reason for visit: Follow up     Dx/Hx/Sx: Prostatectomy , bilateral pelvic lymphadenectomy done 5/19/17 via     Records/imaging/labs/orders: PSA due 6/2/25    At Rooming: Standard    Flavio Jamison  5/15/2025  9:26 AM

## 2025-05-29 ENCOUNTER — DOCUMENTATION ONLY (OUTPATIENT)
Dept: UROLOGY | Facility: CLINIC | Age: 80
End: 2025-05-29
Payer: MEDICARE

## 2025-07-19 ENCOUNTER — HEALTH MAINTENANCE LETTER (OUTPATIENT)
Age: 80
End: 2025-07-19

## (undated) DEVICE — ENDO TROCAR CONMED AIRSEAL BLADELESS 12X120MM IAS12-120LP

## (undated) DEVICE — SOL NACL 0.9% INJ 1000ML BAG 07983-09

## (undated) DEVICE — PREP POVIDONE IODINE SOLUTION 10% 120ML

## (undated) DEVICE — SU MONOCRYL 4-0 PS-2 27" UND Y426H

## (undated) DEVICE — CLIP ENDO HEMO-LOC PURPLE LG 544240

## (undated) DEVICE — GLOVE ESTEEM BLUE W/NEU-THERA 7.5  2D73PB75

## (undated) DEVICE — LINEN TOWEL PACK X6 WHITE 5487

## (undated) DEVICE — SU ETHILON 2-0 FS 18" 664H

## (undated) DEVICE — DAVINCI XI NDL DRIVER LARGE 470006

## (undated) DEVICE — TUBING CONMED AIRSEAL SMOKE EVAC INSUFFLATION ASM-EVAC

## (undated) DEVICE — PREP CHLORAPREP 26ML TINTED ORANGE  260815

## (undated) DEVICE — DAVINCI XI MONOPOLAR SCISSORS HOT SHEARS 8MM 470179

## (undated) DEVICE — DRAIN JACKSON PRATT CHANNEL 19FR ROUND HUBLESS SIL JP-2230

## (undated) DEVICE — DAVINCI XI SEAL UNIVERSAL 5-8MM 470361

## (undated) DEVICE — GLOVE PROTEXIS MICRO 7.0  2D73PM70

## (undated) DEVICE — LINEN TOWEL PACK X30 5481

## (undated) DEVICE — PACK DAVINCI UROL

## (undated) DEVICE — DAVINCI XI GRASPER ENDOWRIST PROGRASP 470093

## (undated) DEVICE — PREP POVIDONE IODINE SCRUB 7.5% 120ML

## (undated) DEVICE — SU VICRYL 3-0 SH 27" J316H

## (undated) DEVICE — SU MONOCRYL 3-0 RB-1 27" Y305H

## (undated) DEVICE — CATH SECURE 5445-3

## (undated) DEVICE — SU VICRYL 0 UR-6 27" J603H

## (undated) DEVICE — CATH FOLYSIL 18FR 15ML AA6118

## (undated) DEVICE — SUCTION MANIFOLD DORNOCH ULTRA CART UL-CL500

## (undated) DEVICE — PROTECTOR ARM ONE-STEP TRENDELENBURG 40418

## (undated) DEVICE — DRAPE IOBAN INCISE 13X13" 6640EZ

## (undated) DEVICE — SU WND CLOSURE VLOC 180 ABS 3-0 6" V-20 VLOCL0604

## (undated) DEVICE — DRSG PRIMAPORE 02X3" 7133

## (undated) DEVICE — SYSTEM CLEARIFY VISUALIZATION 21-345

## (undated) DEVICE — DAVINCI XI DRAPE ARM 470015

## (undated) DEVICE — KIT PATIENT POSITIONING PIGAZZI LATEX FREE 40580

## (undated) DEVICE — Device

## (undated) DEVICE — PACK GOWN 3/PK DISP XL SBA32GPFCB

## (undated) DEVICE — WIPES FOLEY CARE SURESTEP PROVON DFC100

## (undated) DEVICE — DAVINCI XI DRAPE COLUMN 470341

## (undated) DEVICE — DRAPE SHEET MED 44X70" 9355

## (undated) DEVICE — DAVINCI HOT SHEARS TIP COVER  400180

## (undated) DEVICE — DAVINCI XI FCP BIPOLAR FENESTRATED 470205

## (undated) DEVICE — SU DERMABOND ADVANCED .7ML DNX12

## (undated) DEVICE — ESU GROUND PAD ADULT REM W/15' CORD E7507DB

## (undated) DEVICE — BLADE CLIPPER SGL USE 9680

## (undated) DEVICE — DRAPE SHEET REV FOLD 3/4 9349

## (undated) DEVICE — BAG SPECIMEN NYLON MEMORY WIRE 3"X6" SB1036

## (undated) DEVICE — SOL WATER IRRIG 1000ML BOTTLE 2F7114

## (undated) DEVICE — SU MONOCRYL 3-0 RB-1 27" UND Y215H

## (undated) DEVICE — LINEN GOWN XLG 5407

## (undated) DEVICE — CLIP ABSORBABLE LAPRO-CLIP 12MM 8886848812

## (undated) RX ORDER — HEPARIN SODIUM 5000 [USP'U]/.5ML
INJECTION, SOLUTION INTRAVENOUS; SUBCUTANEOUS
Status: DISPENSED
Start: 2017-05-19

## (undated) RX ORDER — CEFAZOLIN SODIUM 2 G/100ML
INJECTION, SOLUTION INTRAVENOUS
Status: DISPENSED
Start: 2017-05-19

## (undated) RX ORDER — GENTAMICIN 40 MG/ML
INJECTION, SOLUTION INTRAMUSCULAR; INTRAVENOUS
Status: DISPENSED
Start: 2017-03-22

## (undated) RX ORDER — LIDOCAINE HYDROCHLORIDE 10 MG/ML
INJECTION, SOLUTION INFILTRATION; PERINEURAL
Status: DISPENSED
Start: 2017-03-22

## (undated) RX ORDER — SODIUM CHLORIDE 9 MG/ML
INJECTION, SOLUTION INTRAVENOUS
Status: DISPENSED
Start: 2017-05-19

## (undated) RX ORDER — CIPROFLOXACIN 500 MG/1
TABLET, FILM COATED ORAL
Status: DISPENSED
Start: 2017-05-31